# Patient Record
Sex: FEMALE | Race: WHITE | NOT HISPANIC OR LATINO | Employment: UNEMPLOYED | URBAN - METROPOLITAN AREA
[De-identification: names, ages, dates, MRNs, and addresses within clinical notes are randomized per-mention and may not be internally consistent; named-entity substitution may affect disease eponyms.]

---

## 2017-01-04 ENCOUNTER — OFFICE VISIT (OUTPATIENT)
Dept: INTERNAL MEDICINE | Facility: CLINIC | Age: 52
End: 2017-01-04

## 2017-01-04 VITALS
RESPIRATION RATE: 16 BRPM | HEART RATE: 78 BPM | BODY MASS INDEX: 32.01 KG/M2 | DIASTOLIC BLOOD PRESSURE: 86 MMHG | SYSTOLIC BLOOD PRESSURE: 122 MMHG | WEIGHT: 175 LBS

## 2017-01-04 DIAGNOSIS — R82.90 MALODOROUS URINE: ICD-10-CM

## 2017-01-04 DIAGNOSIS — M54.50 CHRONIC LOW BACK PAIN WITHOUT SCIATICA, UNSPECIFIED BACK PAIN LATERALITY: ICD-10-CM

## 2017-01-04 DIAGNOSIS — F41.9 ANXIETY: ICD-10-CM

## 2017-01-04 DIAGNOSIS — J44.9 CHRONIC OBSTRUCTIVE PULMONARY DISEASE, UNSPECIFIED COPD TYPE (HCC): ICD-10-CM

## 2017-01-04 DIAGNOSIS — F32.A DEPRESSION, UNSPECIFIED DEPRESSION TYPE: Primary | ICD-10-CM

## 2017-01-04 DIAGNOSIS — M79.2 NERVE PAIN: ICD-10-CM

## 2017-01-04 DIAGNOSIS — R23.2 HOT FLASHES: ICD-10-CM

## 2017-01-04 DIAGNOSIS — G89.29 CHRONIC LOW BACK PAIN WITHOUT SCIATICA, UNSPECIFIED BACK PAIN LATERALITY: ICD-10-CM

## 2017-01-04 LAB
CLARITY, POC: ABNORMAL
COLOR UR: YELLOW
EXPIRATION DATE: ABNORMAL
GLUCOSE UR STRIP-MCNC: NEGATIVE MG/DL
KETONES UR QL: NEGATIVE
LEUKOCYTE EST, POC: ABNORMAL
Lab: ABNORMAL
NITRITE UR-MCNC: POSITIVE MG/ML
PH UR: 7 [PH] (ref 5–8)
PROT UR STRIP-MCNC: ABNORMAL MG/DL
RBC # UR STRIP: ABNORMAL /UL
SP GR UR: 1.01 (ref 1–1.03)

## 2017-01-04 PROCEDURE — 99214 OFFICE O/P EST MOD 30 MIN: CPT | Performed by: INTERNAL MEDICINE

## 2017-01-04 PROCEDURE — 81002 URINALYSIS NONAUTO W/O SCOPE: CPT | Performed by: INTERNAL MEDICINE

## 2017-01-04 RX ORDER — DULOXETIN HYDROCHLORIDE 60 MG/1
60 CAPSULE, DELAYED RELEASE ORAL DAILY
Qty: 30 CAPSULE | Refills: 5 | Status: SHIPPED | OUTPATIENT
Start: 2017-01-04 | End: 2017-04-07 | Stop reason: DRUGHIGH

## 2017-01-04 RX ORDER — IPRATROPIUM/ALBUTEROL SULFATE 20-100 MCG
1 MIST INHALER (GRAM) INHALATION
Qty: 4 G | Refills: 5 | Status: SHIPPED | OUTPATIENT
Start: 2017-01-04 | End: 2017-07-07 | Stop reason: SDUPTHER

## 2017-01-04 RX ORDER — DULOXETIN HYDROCHLORIDE 30 MG/1
30 CAPSULE, DELAYED RELEASE ORAL DAILY
Qty: 30 CAPSULE | Refills: 5 | Status: SHIPPED | OUTPATIENT
Start: 2017-01-04 | End: 2017-04-07 | Stop reason: DRUGHIGH

## 2017-01-04 RX ORDER — CLONAZEPAM 1 MG/1
TABLET ORAL
Qty: 60 TABLET | Refills: 0 | Status: SHIPPED | OUTPATIENT
Start: 2017-01-04 | End: 2017-02-09 | Stop reason: SDUPTHER

## 2017-01-04 RX ORDER — ALBUTEROL SULFATE 2.5 MG/3ML
2.5 SOLUTION RESPIRATORY (INHALATION) EVERY 6 HOURS PRN
Qty: 75 ML | Refills: 2 | Status: SHIPPED | OUTPATIENT
Start: 2017-01-04

## 2017-01-04 RX ORDER — ESTRADIOL 1 MG/1
1 TABLET ORAL DAILY
Qty: 30 TABLET | Refills: 5 | Status: SHIPPED | OUTPATIENT
Start: 2017-01-04 | End: 2017-04-07 | Stop reason: SDUPTHER

## 2017-01-04 RX ORDER — GABAPENTIN 800 MG/1
800 TABLET ORAL 3 TIMES DAILY
Qty: 90 TABLET | Refills: 2 | Status: SHIPPED | OUTPATIENT
Start: 2017-01-04 | End: 2017-01-29 | Stop reason: SDUPTHER

## 2017-01-04 NOTE — PROGRESS NOTES
Subjective   Darcy Palomino is a 51 y.o. female.Pt is here to follow up on COPD, neuropathy and  anxiety/depression.              History of Present Illness   Pt is here to follow up on COPD, neuropathy and  anxiety/depression. She states all issues are stable. She does request a refill on her Klonopin, she states she is going in several weeks to her sister's Psych, Dr. Gomez, in [place].   She also request a Rx for a TENS unit due to her chronic low back pain. She states she has tried her brother's which does help her back.   She has the acute complaint of malodorous urine for the last 2 weeks. She denies fever, dysuria or chills.             The following portions of the patient's history were reviewed and updated as appropriate: allergies, current medications, past family history, past medical history, past social history, past surgical history and problem list.             Review of Systems   Constitutional: Negative.    HENT: Negative.    Eyes: Negative.    Respiratory:        See HPI.    Cardiovascular: Negative.    Gastrointestinal: Negative.    Endocrine: Negative.    Genitourinary:        See HPI.    Musculoskeletal: Negative.    Skin: Negative.    Allergic/Immunologic: Negative.    Neurological:        See HPI.    Hematological: Negative.    Psychiatric/Behavioral:        See HPI.                 Objective   Physical Exam   Constitutional: She is oriented to person, place, and time. She appears well-developed and well-nourished.   HENT:   Head: Normocephalic and atraumatic.   Right Ear: External ear normal.   Left Ear: External ear normal.   Nose: Nose normal.   Mouth/Throat: Oropharynx is clear and moist.   Eyes: Conjunctivae and EOM are normal. Pupils are equal, round, and reactive to light.   Neck: Normal range of motion. Neck supple. No tracheal deviation present. No thyromegaly present.   Cardiovascular: Normal rate, regular rhythm, normal heart sounds and intact distal pulses.    Pulmonary/Chest:  Effort normal and breath sounds normal.   Abdominal: Soft. Bowel sounds are normal. She exhibits no distension. There is no tenderness.   Neurological: She is alert and oriented to person, place, and time. She has normal reflexes.   Skin: Skin is warm and dry.   Psychiatric: She has a normal mood and affect.   Nursing note and vitals reviewed.                 Assessment/Plan   Darcy was seen today for copd.    Diagnoses and all orders for this visit:    Depression, unspecified depression type    Nerve pain  -     gabapentin (NEURONTIN) 800 MG tablet; Take 1 tablet by mouth 3 (Three) Times a Day.    Chronic obstructive pulmonary disease, unspecified COPD type  -     COMBIVENT RESPIMAT  MCG/ACT inhaler; Inhale 1 puff 4 (Four) Times a Day.  -     albuterol (PROVENTIL) (2.5 MG/3ML) 0.083% nebulizer solution; Take 2.5 mg by nebulization Every 6 (Six) Hours As Needed for wheezing or shortness of air.    Anxiety    Other orders  -     estradiol (ESTRACE) 1 MG tablet; Take 1 tablet by mouth Daily.  -     DULoxetine (CYMBALTA) 60 MG capsule; Take 1 capsule by mouth Daily.  -     DULoxetine (CYMBALTA) 30 MG capsule; Take 1 capsule by mouth Daily.                1.) Refill chronic meds   2.) TENS unit for chronic neck and back pain ordered.  3.) Klonopin 1mg PO BID ,  IF she does not get in psych in next few weeks.   Take 1 mg PO BID X 3 days, then take 1 mg PO daily X 14 days, then 0.5 mg PO daily X 14 days. How to dose and possible s/e    discussed.   4.) UDS today- due to klonopin use.   5.) Urinalysis for malodorous urine.     * Total time spent in discussion and exam 30 minutes.

## 2017-01-04 NOTE — TELEPHONE ENCOUNTER
----- Message from Rubia Low DO sent at 1/4/2017  1:11 PM EST -----  Let patient know her urinalysis was positive. I have sent for culture. Send in Cipro 500 mg PO BID X 7 days with no refills. Tell her to eat before taking this medication as it may make her nauseated.

## 2017-01-04 NOTE — MR AVS SNAPSHOT
Darcy Palomino   1/4/2017 11:30 AM   Office Visit    Provider:  Rubia Low DO   Department:  [facility] INTERNAL MEDICINE AND PEDIATRICS   Dept Phone:  [phone]                Your Full Care Plan              Today's Medication Changes          These changes are accurate as of: 1/4/17 11:58 AM.  If you have any questions, ask your nurse or doctor.               Medication(s)that have changed:     clonazePAM 1 MG tablet   Commonly known as:  KLONOPIN   Take 1mg PO BID prn   What changed:  additional instructions   Changed by:  Rubia Low DO            Where to Get Your Medications      These medications were sent to [address] AT [address] [phone] [place] [phone] [place] [address]     Phone:  [phone]     albuterol (2.5 MG/3ML) 0.083% nebulizer solution    COMBIVENT RESPIMAT  MCG/ACT inhaler    DULoxetine 30 MG capsule    DULoxetine 60 MG capsule    estradiol 1 MG tablet    gabapentin 800 MG tablet         You can get these medications from any pharmacy     Bring a paper prescription for each of these medications     clonazePAM 1 MG tablet                  Your Updated Medication List          This list is accurate as of: 1/4/17 11:58 AM.  Always use your most recent med list.                albuterol (2.5 MG/3ML) 0.083% nebulizer solution   Commonly known as:  PROVENTIL   Take 2.5 mg by nebulization Every 6 (Six) Hours As Needed for wheezing or shortness of air.       clonazePAM 1 MG tablet   Commonly known as:  KLONOPIN   Take 1mg PO BID prn       COMBIVENT RESPIMAT  MCG/ACT inhaler   Generic drug:  ipratropium-albuterol   Inhale 1 puff 4 (Four) Times a Day.       * DULoxetine 60 MG capsule   Commonly known as:  CYMBALTA   Take 1 capsule by mouth Daily.       * DULoxetine 30 MG capsule   Commonly known as:  CYMBALTA   Take 1 capsule by mouth  Daily.       estradiol 1 MG tablet   Commonly known as:  ESTRACE   Take 1 tablet by mouth Daily.       gabapentin 800 MG tablet   Commonly known as:  NEURONTIN   Take 1 tablet by mouth 3 (Three) Times a Day.       ondansetron 4 MG tablet   Commonly known as:  ZOFRAN   Take 1 tablet by mouth Every 8 (Eight) Hours As Needed for nausea or vomiting.       oxyCODONE-acetaminophen  MG per tablet   Commonly known as:  PERCOCET   Take 1 tablet by mouth Every 6 (Six) Hours As Needed for moderate pain (4-6).       tiZANidine 4 MG tablet   Commonly known as:  ZANAFLEX   TAKE 1 TABLET BY MOUTH EVERY 8 HOURS       * Notice:  This list has 2 medication(s) that are the same as other medications prescribed for you. Read the directions carefully, and ask your doctor or other care provider to review them with you.            We Performed the Following     Pain Management Profile (13 Drugs) Urine     TENS (Transcutaneous Electrical Nerve Stimulator)       You Were Diagnosed With        Codes Comments    Depression, unspecified depression type    -  Primary ICD-10-CM: F32.9  ICD-9-CM: 311     Nerve pain     ICD-10-CM: M79.2  ICD-9-CM: 729.2     Chronic obstructive pulmonary disease, unspecified COPD type     ICD-10-CM: J44.9  ICD-9-CM: 496     Anxiety     ICD-10-CM: F41.9  ICD-9-CM: 300.00     Hot flashes     ICD-10-CM: R23.2  ICD-9-CM: 782.62     Chronic low back pain without sciatica, unspecified back pain laterality     ICD-10-CM: M54.5, G89.29  ICD-9-CM: 724.2, 338.29       Instructions     None    Patient Instructions History      MyChart Signup     Our records indicate that you have declined [facility] [employer] signup. If you would like to sign up for [address] [employer], please email [email or web address] or call [phone] to obtain an activation code.             Other Info from Your Visit           Your Appointments     Apr 06, 2017  8:15 AM EDT   Follow Up with Rubia Low,    [facility]  INTERNAL MEDICINE AND PEDIATRICS (--)    [address] [facility] [address]  [facility] [address] [phone]           Arrive 15 minutes prior to appointment.              Allergies     Lidocaine      Nsaids      Toradol [Ketorolac Tromethamine]        Reason for Visit     COPD follow up      Vital Signs     Blood Pressure Pulse Respirations Weight Body Mass Index Smoking Status    122/86 (BP Location: Left arm, Patient Position: Sitting) 78 16 175 lb (79.4 kg) 32.01 kg/m2 Current Every Day Smoker      Problems and Diagnoses Noted     Anxiety problem    Chronic airway obstruction    Depression    Hot flashes    Nerve pain    Chronic low back pain without sciatica, unspecified back pain laterality          No Longer an Issue     Urinary tract infection

## 2017-01-05 RX ORDER — CIPROFLOXACIN 500 MG/1
500 TABLET, FILM COATED ORAL 2 TIMES DAILY
Qty: 14 TABLET | Refills: 0 | Status: SHIPPED | OUTPATIENT
Start: 2017-01-05 | End: 2017-02-09

## 2017-01-07 LAB
BACTERIA UR CULT: ABNORMAL
OTHER ANTIBIOTIC SUSC ISLT: ABNORMAL

## 2017-01-09 ENCOUNTER — TELEPHONE (OUTPATIENT)
Dept: INTERNAL MEDICINE | Facility: CLINIC | Age: 52
End: 2017-01-09

## 2017-01-09 RX ORDER — AMOXICILLIN AND CLAVULANATE POTASSIUM 875; 125 MG/1; MG/1
1 TABLET, FILM COATED ORAL 2 TIMES DAILY
Qty: 20 TABLET | Refills: 0 | Status: SHIPPED | OUTPATIENT
Start: 2017-01-09 | End: 2017-02-09

## 2017-01-09 NOTE — TELEPHONE ENCOUNTER
----- Message from Rubia Low DO sent at 1/9/2017 12:54 PM EST -----  Send in Augmentin 875 mg PO BID X 10 days with no refills.   She does not have to repeat another urinalysis after unless she has symptoms.

## 2017-01-10 LAB
AMPHETAMINES UR QL SCN: NEGATIVE NG/ML
BARBITURATES UR QL SCN: NEGATIVE NG/ML
BENZODIAZ UR QL SCN: NEGATIVE NG/ML
BZE UR QL SCN: NEGATIVE NG/ML
CANNABINOIDS UR QL SCN: NEGATIVE NG/ML
CREAT UR-MCNC: 48.1 MG/DL (ref 20–300)
FENTANYL+NORFENTANYL UR QL SCN: NEGATIVE PG/ML
Lab: ABNORMAL
MEPERIDINE UR QL: NEGATIVE NG/ML
METHADONE UR QL SCN: NEGATIVE NG/ML
OPIATES UR QL SCN: POSITIVE NG/ML
OXYCODONE+OXYMORPHONE UR QL SCN: POSITIVE
PCP UR QL: NEGATIVE NG/ML
PH UR: 8.4 [PH] (ref 4.5–8.9)
PROPOXYPH UR QL SCN: NEGATIVE NG/ML
SP GR UR: 1.01
TRAMADOL UR QL SCN: NEGATIVE NG/ML

## 2017-01-29 DIAGNOSIS — M79.2 NERVE PAIN: ICD-10-CM

## 2017-01-30 RX ORDER — GABAPENTIN 800 MG/1
TABLET ORAL
Qty: 90 TABLET | Refills: 3 | Status: SHIPPED | OUTPATIENT
Start: 2017-01-30

## 2017-02-09 ENCOUNTER — OFFICE VISIT (OUTPATIENT)
Dept: INTERNAL MEDICINE | Facility: CLINIC | Age: 52
End: 2017-02-09

## 2017-02-09 ENCOUNTER — HOSPITAL ENCOUNTER (OUTPATIENT)
Dept: GENERAL RADIOLOGY | Facility: HOSPITAL | Age: 52
Discharge: HOME | End: 2017-02-09
Admitting: PHYSICIAN ASSISTANT

## 2017-02-09 VITALS
OXYGEN SATURATION: 97 % | DIASTOLIC BLOOD PRESSURE: 88 MMHG | HEART RATE: 87 BPM | SYSTOLIC BLOOD PRESSURE: 126 MMHG | WEIGHT: 164 LBS | TEMPERATURE: 97.9 F | RESPIRATION RATE: 18 BRPM | BODY MASS INDEX: 30 KG/M2

## 2017-02-09 DIAGNOSIS — N30.01 ACUTE CYSTITIS WITH HEMATURIA: ICD-10-CM

## 2017-02-09 DIAGNOSIS — R82.90 BAD ODOR OF URINE: Primary | ICD-10-CM

## 2017-02-09 DIAGNOSIS — G89.29 CHRONIC PAIN OF RIGHT KNEE: ICD-10-CM

## 2017-02-09 DIAGNOSIS — M25.561 CHRONIC PAIN OF RIGHT KNEE: ICD-10-CM

## 2017-02-09 DIAGNOSIS — F41.9 ANXIETY: ICD-10-CM

## 2017-02-09 DIAGNOSIS — H60.339 SWIMMER'S EAR, UNSPECIFIED CHRONICITY, UNSPECIFIED LATERALITY: ICD-10-CM

## 2017-02-09 PROBLEM — Z93.6 PRESENCE OF UROSTOMY (HCC): Status: ACTIVE | Noted: 2017-02-09

## 2017-02-09 LAB
BILIRUB BLD-MCNC: NEGATIVE MG/DL
CLARITY, POC: CLEAR
COLOR UR: YELLOW
EXPIRATION DATE: ABNORMAL
GLUCOSE UR STRIP-MCNC: NEGATIVE MG/DL
KETONES UR QL: NEGATIVE
LEUKOCYTE EST, POC: NEGATIVE
Lab: ABNORMAL
NITRITE UR-MCNC: NEGATIVE MG/ML
PH UR: 8 [PH] (ref 5–8)
PROT UR STRIP-MCNC: NEGATIVE MG/DL
RBC # UR STRIP: ABNORMAL /UL
SP GR UR: 1.01 (ref 1–1.03)
UROBILINOGEN UR QL: NORMAL

## 2017-02-09 PROCEDURE — 87086 URINE CULTURE/COLONY COUNT: CPT | Performed by: PHYSICIAN ASSISTANT

## 2017-02-09 PROCEDURE — 81003 URINALYSIS AUTO W/O SCOPE: CPT | Performed by: PHYSICIAN ASSISTANT

## 2017-02-09 PROCEDURE — 73562 X-RAY EXAM OF KNEE 3: CPT

## 2017-02-09 PROCEDURE — 99214 OFFICE O/P EST MOD 30 MIN: CPT | Performed by: PHYSICIAN ASSISTANT

## 2017-02-09 RX ORDER — METHYLPREDNISOLONE 4 MG/1
TABLET ORAL
Qty: 21 TABLET | Refills: 0 | Status: SHIPPED | OUTPATIENT
Start: 2017-02-09 | End: 2017-02-18 | Stop reason: HOSPADM

## 2017-02-09 RX ORDER — CIPROFLOXACIN 500 MG/1
500 TABLET, FILM COATED ORAL 2 TIMES DAILY
Qty: 14 TABLET | Refills: 0 | Status: SHIPPED | OUTPATIENT
Start: 2017-02-09 | End: 2017-02-18 | Stop reason: HOSPADM

## 2017-02-09 RX ORDER — PROMETHAZINE HYDROCHLORIDE 25 MG/1
TABLET ORAL
COMMUNITY
Start: 2017-01-15 | End: 2017-05-16 | Stop reason: SDUPTHER

## 2017-02-09 RX ORDER — CLONAZEPAM 1 MG/1
TABLET ORAL
Qty: 60 TABLET | Refills: 0 | Status: SHIPPED | OUTPATIENT
Start: 2017-02-09 | End: 2017-04-05 | Stop reason: ALTCHOICE

## 2017-02-09 NOTE — PROGRESS NOTES
Subjective   Darcy Palomino is a 51 y.o. female.   Chief Complaint   Patient presents with   • Urinary Tract Infection   • Earache   • Joint Swelling     knee     History of Present Illness   PT complains of bilateral ear pain x 4-5 days, right worse than left, right knee pain.  PT complains of vaginal itching, urine has odor x 2 weeks.  She has urostomy bag x 1 year.    Hx of fracture of right knee.    PT is going to see psychiatrist next month and requests refills on clonazepam.  Getting hives from stress.    The following portions of the patient's history were reviewed and updated as appropriate: allergies, current medications and problem list.    Review of Systems   HENT: Positive for ear pain.    Psychiatric/Behavioral: Negative for sleep disturbance.       Objective   Physical Exam   Constitutional: She appears well-developed and well-nourished.   HENT:   Head: Normocephalic and atraumatic.   Right Ear: Tympanic membrane and external ear normal.   Left Ear: Tympanic membrane and external ear normal.   Mouth/Throat: No posterior oropharyngeal erythema. No tonsillar exudate.   Bilateral ear canals are edematous with erythema.   Eyes: Conjunctivae are normal.   Cardiovascular: Normal rate, regular rhythm and normal heart sounds.  Exam reveals no gallop and no friction rub.    No murmur heard.  Pulmonary/Chest: Effort normal and breath sounds normal.   Abdominal: Normal appearance and bowel sounds are normal. There is no tenderness. There is no CVA tenderness.   Musculoskeletal:        Right knee: She exhibits decreased range of motion, effusion and abnormal patellar mobility. She exhibits no swelling, no ecchymosis, no erythema, no LCL laxity and no MCL laxity. Tenderness found. Lateral joint line tenderness noted. No patellar tendon tenderness noted.   Psychiatric: She has a normal mood and affect.   Vitals reviewed.      Assessment/Plan   Darcy was seen today for urinary tract infection, earache and joint  swelling.    Diagnoses and all orders for this visit:    Bad odor of urine  -     POC Urinalysis Dipstick, Automated  -     Urine Culture    Anxiety  -     clonazePAM (KLONOPIN) 1 MG tablet; Take 1mg PO BID prn    Chronic pain of right knee  -     MethylPREDNISolone (MEDROL, PARVIN,) 4 MG tablet; Take as directed on package instructions.  -     XR Knee 3 View Right    Acute cystitis with hematuria  -     ciprofloxacin (CIPRO) 500 MG tablet; Take 1 tablet by mouth 2 (Two) Times a Day.    Swimmer's ear, unspecified chronicity, unspecified laterality  -     ciprofloxacin (CIPRO) 500 MG tablet; Take 1 tablet by mouth 2 (Two) Times a Day.

## 2017-02-10 ENCOUNTER — TELEPHONE (OUTPATIENT)
Dept: INTERNAL MEDICINE | Facility: CLINIC | Age: 52
End: 2017-02-10

## 2017-02-10 DIAGNOSIS — M25.561 RIGHT KNEE PAIN, UNSPECIFIED CHRONICITY: Primary | ICD-10-CM

## 2017-02-10 NOTE — TELEPHONE ENCOUNTER
----- Message from Aaron Montoya sent at 2/10/2017  3:15 PM EST -----  [phone]    PT RETURNING PHONE CALL

## 2017-02-10 NOTE — TELEPHONE ENCOUNTER
----- Message from Griselda Mohr MA sent at 2/10/2017 11:53 AM EST -----  PT HAD XRAYS DONE YESTERDAY AND CALLING TO GET THE RESULTS. YOU CAN CONTACT PT [phone]

## 2017-02-10 NOTE — TELEPHONE ENCOUNTER
pls let her know that xray shows a bone spur in her knee cap. Will refer to ortho to see if joint injection might help.  Recommend she get a copy the xr and bring to that appt.

## 2017-02-12 LAB
BACTERIA UR CULT: NORMAL
Lab: NORMAL

## 2017-02-17 ENCOUNTER — HOSPITAL ENCOUNTER (OUTPATIENT)
Facility: HOSPITAL | Age: 52
Setting detail: OBSERVATION
LOS: 1 days | Discharge: HOME | End: 2017-02-18
Attending: EMERGENCY MEDICINE | Admitting: INTERNAL MEDICINE

## 2017-02-17 ENCOUNTER — APPOINTMENT (OUTPATIENT)
Dept: GENERAL RADIOLOGY | Facility: HOSPITAL | Age: 52
End: 2017-02-17

## 2017-02-17 ENCOUNTER — APPOINTMENT (OUTPATIENT)
Dept: CARDIOLOGY | Facility: HOSPITAL | Age: 52
End: 2017-02-17
Attending: INTERNAL MEDICINE

## 2017-02-17 DIAGNOSIS — R07.9 CHEST PAIN, UNSPECIFIED TYPE: ICD-10-CM

## 2017-02-17 DIAGNOSIS — R74.8 ELEVATED LIPASE: Primary | ICD-10-CM

## 2017-02-17 PROBLEM — Z72.0 TOBACCO ABUSE: Status: ACTIVE | Noted: 2017-02-17

## 2017-02-17 PROBLEM — N18.30 CHRONIC KIDNEY DISEASE, STAGE 3 (HCC): Status: ACTIVE | Noted: 2017-02-17

## 2017-02-17 LAB
ALBUMIN SERPL-MCNC: 4.6 G/DL (ref 3.2–4.8)
ALBUMIN/GLOB SERPL: 1.4 G/DL (ref 1.5–2.5)
ALP SERPL-CCNC: 78 U/L (ref 25–100)
ALT SERPL W P-5'-P-CCNC: 12 U/L (ref 7–40)
ANION GAP SERPL CALCULATED.3IONS-SCNC: 5 MMOL/L (ref 3–11)
AST SERPL-CCNC: 16 U/L (ref 0–33)
BASOPHILS # BLD AUTO: 0.06 10*3/MM3 (ref 0–0.2)
BASOPHILS NFR BLD AUTO: 0.6 % (ref 0–1)
BILIRUB SERPL-MCNC: 0.3 MG/DL (ref 0.3–1.2)
BNP SERPL-MCNC: 14 PG/ML (ref 0–100)
BUN BLD-MCNC: 30 MG/DL (ref 9–23)
BUN/CREAT SERPL: 20 (ref 7–25)
CALCIUM SPEC-SCNC: 10.2 MG/DL (ref 8.7–10.4)
CHLORIDE SERPL-SCNC: 103 MMOL/L (ref 99–109)
CO2 SERPL-SCNC: 29 MMOL/L (ref 20–31)
CREAT BLD-MCNC: 1.5 MG/DL (ref 0.6–1.3)
DEPRECATED RDW RBC AUTO: 49.5 FL (ref 37–54)
EOSINOPHIL # BLD AUTO: 0.34 10*3/MM3 (ref 0.1–0.3)
EOSINOPHIL NFR BLD AUTO: 3.5 % (ref 0–3)
ERYTHROCYTE [DISTWIDTH] IN BLOOD BY AUTOMATED COUNT: 13.4 % (ref 11.3–14.5)
GFR SERPL CREATININE-BSD FRML MDRD: 37 ML/MIN/1.73
GLOBULIN UR ELPH-MCNC: 3.4 GM/DL
GLUCOSE BLD-MCNC: 82 MG/DL (ref 70–100)
HCT VFR BLD AUTO: 52.1 % (ref 34.5–44)
HGB BLD-MCNC: 17.9 G/DL (ref 11.5–15.5)
HOLD SPECIMEN: NORMAL
HOLD SPECIMEN: NORMAL
IMM GRANULOCYTES # BLD: 0.03 10*3/MM3 (ref 0–0.03)
IMM GRANULOCYTES NFR BLD: 0.3 % (ref 0–0.6)
LIPASE SERPL-CCNC: 184 U/L (ref 6–51)
LV EF NUC BP: 64 %
LYMPHOCYTES # BLD AUTO: 3.58 10*3/MM3 (ref 0.6–4.8)
LYMPHOCYTES NFR BLD AUTO: 37.3 % (ref 24–44)
MCH RBC QN AUTO: 34.6 PG (ref 27–31)
MCHC RBC AUTO-ENTMCNC: 34.4 G/DL (ref 32–36)
MCV RBC AUTO: 100.8 FL (ref 80–99)
MONOCYTES # BLD AUTO: 0.76 10*3/MM3 (ref 0–1)
MONOCYTES NFR BLD AUTO: 7.9 % (ref 0–12)
NEUTROPHILS # BLD AUTO: 4.82 10*3/MM3 (ref 1.5–8.3)
NEUTROPHILS NFR BLD AUTO: 50.4 % (ref 41–71)
PLATELET # BLD AUTO: 250 10*3/MM3 (ref 150–450)
PMV BLD AUTO: 9.2 FL (ref 6–12)
POTASSIUM BLD-SCNC: 3.5 MMOL/L (ref 3.5–5.5)
PROT SERPL-MCNC: 8 G/DL (ref 5.7–8.2)
RBC # BLD AUTO: 5.17 10*6/MM3 (ref 3.89–5.14)
SODIUM BLD-SCNC: 137 MMOL/L (ref 132–146)
TROPONIN I SERPL-MCNC: 0 NG/ML (ref 0–0.07)
TROPONIN I SERPL-MCNC: 0.02 NG/ML (ref 0–0.07)
WBC NRBC COR # BLD: 9.59 10*3/MM3 (ref 3.5–10.8)
WHOLE BLOOD HOLD SPECIMEN: NORMAL
WHOLE BLOOD HOLD SPECIMEN: NORMAL

## 2017-02-17 PROCEDURE — 25010000002 MORPHINE PER 10 MG: Performed by: EMERGENCY MEDICINE

## 2017-02-17 PROCEDURE — 96372 THER/PROPH/DIAG INJ SC/IM: CPT

## 2017-02-17 PROCEDURE — 84484 ASSAY OF TROPONIN QUANT: CPT

## 2017-02-17 PROCEDURE — 71010 HC CHEST PA OR AP: CPT

## 2017-02-17 PROCEDURE — 83880 ASSAY OF NATRIURETIC PEPTIDE: CPT | Performed by: EMERGENCY MEDICINE

## 2017-02-17 PROCEDURE — 83690 ASSAY OF LIPASE: CPT | Performed by: EMERGENCY MEDICINE

## 2017-02-17 PROCEDURE — 25010000002 REGADENOSON 0.4 MG/5ML SOLUTION: Performed by: INTERNAL MEDICINE

## 2017-02-17 PROCEDURE — 96376 TX/PRO/DX INJ SAME DRUG ADON: CPT

## 2017-02-17 PROCEDURE — 78492 MYOCRD IMG PET MLT RST&STRS: CPT

## 2017-02-17 PROCEDURE — 96374 THER/PROPH/DIAG INJ IV PUSH: CPT

## 2017-02-17 PROCEDURE — G0378 HOSPITAL OBSERVATION PER HR: HCPCS

## 2017-02-17 PROCEDURE — 0 RUBIDIUM CHLORIDE: Performed by: INTERNAL MEDICINE

## 2017-02-17 PROCEDURE — 99285 EMERGENCY DEPT VISIT HI MDM: CPT

## 2017-02-17 PROCEDURE — 96361 HYDRATE IV INFUSION ADD-ON: CPT

## 2017-02-17 PROCEDURE — 25010000002 HEPARIN (PORCINE) PER 1000 UNITS: Performed by: INTERNAL MEDICINE

## 2017-02-17 PROCEDURE — 80053 COMPREHEN METABOLIC PANEL: CPT | Performed by: EMERGENCY MEDICINE

## 2017-02-17 PROCEDURE — 99220 PR INITIAL OBSERVATION CARE/DAY 70 MINUTES: CPT | Performed by: INTERNAL MEDICINE

## 2017-02-17 PROCEDURE — 85025 COMPLETE CBC W/AUTO DIFF WBC: CPT | Performed by: EMERGENCY MEDICINE

## 2017-02-17 PROCEDURE — 93017 CV STRESS TEST TRACING ONLY: CPT

## 2017-02-17 PROCEDURE — 93018 CV STRESS TEST I&R ONLY: CPT | Performed by: INTERNAL MEDICINE

## 2017-02-17 PROCEDURE — 78492 MYOCRD IMG PET MLT RST&STRS: CPT | Performed by: INTERNAL MEDICINE

## 2017-02-17 PROCEDURE — 93005 ELECTROCARDIOGRAM TRACING: CPT | Performed by: EMERGENCY MEDICINE

## 2017-02-17 PROCEDURE — A9555 RB82 RUBIDIUM: HCPCS | Performed by: INTERNAL MEDICINE

## 2017-02-17 RX ORDER — ASPIRIN 81 MG/1
324 TABLET, CHEWABLE ORAL ONCE
Status: COMPLETED | OUTPATIENT
Start: 2017-02-17 | End: 2017-02-17

## 2017-02-17 RX ORDER — NICOTINE 21 MG/24HR
1 PATCH, TRANSDERMAL 24 HOURS TRANSDERMAL EVERY 24 HOURS
Status: DISCONTINUED | OUTPATIENT
Start: 2017-02-17 | End: 2017-02-18 | Stop reason: HOSPADM

## 2017-02-17 RX ORDER — HEPARIN SODIUM 5000 [USP'U]/ML
5000 INJECTION, SOLUTION INTRAVENOUS; SUBCUTANEOUS EVERY 8 HOURS SCHEDULED
Status: DISCONTINUED | OUTPATIENT
Start: 2017-02-17 | End: 2017-02-18 | Stop reason: HOSPADM

## 2017-02-17 RX ORDER — NICOTINE 21 MG/24HR
1 PATCH, TRANSDERMAL 24 HOURS TRANSDERMAL EVERY 24 HOURS
Status: DISCONTINUED | OUTPATIENT
Start: 2017-02-17 | End: 2017-02-17

## 2017-02-17 RX ORDER — CLONAZEPAM 1 MG/1
1 TABLET ORAL 2 TIMES DAILY PRN
Status: DISCONTINUED | OUTPATIENT
Start: 2017-02-17 | End: 2017-02-18 | Stop reason: HOSPADM

## 2017-02-17 RX ORDER — OXYCODONE AND ACETAMINOPHEN 10; 325 MG/1; MG/1
1 TABLET ORAL EVERY 6 HOURS PRN
Status: DISCONTINUED | OUTPATIENT
Start: 2017-02-17 | End: 2017-02-18 | Stop reason: HOSPADM

## 2017-02-17 RX ORDER — ASPIRIN 325 MG
325 TABLET, DELAYED RELEASE (ENTERIC COATED) ORAL DAILY
Status: DISCONTINUED | OUTPATIENT
Start: 2017-02-17 | End: 2017-02-18 | Stop reason: HOSPADM

## 2017-02-17 RX ORDER — ESTRADIOL 0.5 MG/1
1 TABLET ORAL DAILY
Status: DISCONTINUED | OUTPATIENT
Start: 2017-02-17 | End: 2017-02-18 | Stop reason: HOSPADM

## 2017-02-17 RX ORDER — DULOXETIN HYDROCHLORIDE 60 MG/1
60 CAPSULE, DELAYED RELEASE ORAL DAILY
Status: DISCONTINUED | OUTPATIENT
Start: 2017-02-17 | End: 2017-02-18 | Stop reason: HOSPADM

## 2017-02-17 RX ORDER — NICOTINE 21 MG/24HR
1 PATCH, TRANSDERMAL 24 HOURS TRANSDERMAL
Status: DISCONTINUED | OUTPATIENT
Start: 2017-02-17 | End: 2017-02-17

## 2017-02-17 RX ORDER — DULOXETIN HYDROCHLORIDE 30 MG/1
30 CAPSULE, DELAYED RELEASE ORAL DAILY
Status: DISCONTINUED | OUTPATIENT
Start: 2017-02-17 | End: 2017-02-18 | Stop reason: HOSPADM

## 2017-02-17 RX ORDER — GABAPENTIN 400 MG/1
800 CAPSULE ORAL EVERY 8 HOURS SCHEDULED
Status: DISCONTINUED | OUTPATIENT
Start: 2017-02-17 | End: 2017-02-18 | Stop reason: HOSPADM

## 2017-02-17 RX ORDER — ATORVASTATIN CALCIUM 40 MG/1
40 TABLET, FILM COATED ORAL NIGHTLY
Status: DISCONTINUED | OUTPATIENT
Start: 2017-02-17 | End: 2017-02-18 | Stop reason: HOSPADM

## 2017-02-17 RX ORDER — SODIUM CHLORIDE 0.9 % (FLUSH) 0.9 %
1-10 SYRINGE (ML) INJECTION AS NEEDED
Status: DISCONTINUED | OUTPATIENT
Start: 2017-02-17 | End: 2017-02-18 | Stop reason: HOSPADM

## 2017-02-17 RX ORDER — SODIUM CHLORIDE 0.9 % (FLUSH) 0.9 %
10 SYRINGE (ML) INJECTION AS NEEDED
Status: DISCONTINUED | OUTPATIENT
Start: 2017-02-17 | End: 2017-02-18 | Stop reason: HOSPADM

## 2017-02-17 RX ORDER — MORPHINE SULFATE 4 MG/ML
4 INJECTION, SOLUTION INTRAMUSCULAR; INTRAVENOUS ONCE
Status: COMPLETED | OUTPATIENT
Start: 2017-02-17 | End: 2017-02-17

## 2017-02-17 RX ORDER — NITROGLYCERIN 0.4 MG/1
0.4 TABLET SUBLINGUAL
Status: DISCONTINUED | OUTPATIENT
Start: 2017-02-17 | End: 2017-02-18 | Stop reason: HOSPADM

## 2017-02-17 RX ORDER — TIZANIDINE 4 MG/1
4 TABLET ORAL EVERY 8 HOURS PRN
Status: DISCONTINUED | OUTPATIENT
Start: 2017-02-17 | End: 2017-02-18 | Stop reason: HOSPADM

## 2017-02-17 RX ADMIN — HEPARIN SODIUM 5000 UNITS: 5000 INJECTION, SOLUTION INTRAVENOUS; SUBCUTANEOUS at 13:52

## 2017-02-17 RX ADMIN — NICOTINE 1 PATCH: 14 PATCH TRANSDERMAL at 07:31

## 2017-02-17 RX ADMIN — ESTRADIOL 1 MG: 0.5 TABLET ORAL at 11:17

## 2017-02-17 RX ADMIN — NITROGLYCERIN 0.4 MG: 0.4 TABLET SUBLINGUAL at 06:31

## 2017-02-17 RX ADMIN — TIZANIDINE 4 MG: 4 TABLET ORAL at 22:38

## 2017-02-17 RX ADMIN — SODIUM CHLORIDE 1000 ML: 9 INJECTION, SOLUTION INTRAVENOUS at 04:53

## 2017-02-17 RX ADMIN — REGADENOSON 0.4 MG: 0.08 INJECTION, SOLUTION INTRAVENOUS at 13:26

## 2017-02-17 RX ADMIN — ASPIRIN 81 MG 324 MG: 81 TABLET ORAL at 03:54

## 2017-02-17 RX ADMIN — OXYCODONE HYDROCHLORIDE AND ACETAMINOPHEN 1 TABLET: 10; 325 TABLET ORAL at 21:21

## 2017-02-17 RX ADMIN — ATORVASTATIN CALCIUM 40 MG: 40 TABLET, FILM COATED ORAL at 21:20

## 2017-02-17 RX ADMIN — CLONAZEPAM 1 MG: 1 TABLET ORAL at 12:00

## 2017-02-17 RX ADMIN — RUBIDIUM CHLORIDE RB-82 1 DOSE: 150 INJECTION, SOLUTION INTRAVENOUS at 13:13

## 2017-02-17 RX ADMIN — MORPHINE SULFATE 4 MG: 4 INJECTION, SOLUTION INTRAMUSCULAR; INTRAVENOUS at 04:52

## 2017-02-17 RX ADMIN — OXYCODONE HYDROCHLORIDE AND ACETAMINOPHEN 1 TABLET: 10; 325 TABLET ORAL at 09:27

## 2017-02-17 RX ADMIN — RUBIDIUM CHLORIDE RB-82 1 DOSE: 150 INJECTION, SOLUTION INTRAVENOUS at 13:26

## 2017-02-17 RX ADMIN — DULOXETINE 60 MG: 60 CAPSULE, DELAYED RELEASE ORAL at 11:19

## 2017-02-17 RX ADMIN — DULOXETINE 90 MG: 60 CAPSULE, DELAYED RELEASE ORAL at 11:18

## 2017-02-17 RX ADMIN — MORPHINE SULFATE 4 MG: 4 INJECTION, SOLUTION INTRAMUSCULAR; INTRAVENOUS at 07:17

## 2017-02-17 RX ADMIN — OXYCODONE HYDROCHLORIDE AND ACETAMINOPHEN 1 TABLET: 10; 325 TABLET ORAL at 15:32

## 2017-02-17 RX ADMIN — GABAPENTIN 800 MG: 400 CAPSULE ORAL at 21:20

## 2017-02-17 RX ADMIN — NICOTINE 1 PATCH: 21 PATCH, EXTENDED RELEASE TRANSDERMAL at 13:51

## 2017-02-17 RX ADMIN — HEPARIN SODIUM 5000 UNITS: 5000 INJECTION, SOLUTION INTRAVENOUS; SUBCUTANEOUS at 21:22

## 2017-02-17 RX ADMIN — GABAPENTIN 800 MG: 400 CAPSULE ORAL at 13:52

## 2017-02-17 NOTE — H&P
[facility]  HISTORY AND PHYSICAL    Primary Care Physician: Rubia Low DO    Subjective     Chief Complaint:  Chest pain    History of Present Illness:   Patient is a 51-year-old female with a history of chronic kidney disease, depression, chronic pain, and ongoing tobacco abuse who presents to emergency room with complaints of chest pain.  She woke up around 3 AM with a sharp pain in her left chest which she says radiated to her back.  It lasted 2-3 minutes before easing up and since that time has been intermittent.  She has never had pain like this in the past.  It is associated with her left arm being heavy and some low-grade nausea but no emesis.  She has no cardiac history that she is aware of.  Does not take aspirin.  In the emergency room 2 sets of cardiac enzymes have been negative and EKG shows some nonspecific T-wave changes.  She denies any abdominal pain.  She had a elevated lipase 184 but denies any history of pancreatitis.  She is being admitted for further evaluation.      Review of Systems   Otherwise complete ROS performed and negative except as mentioned in the HPI.    Past Medical History:   Past Medical History   Diagnosis Date   • Anxiety    • Asthma    • Cervical cancer    • Chronic bronchitis    • COPD (chronic obstructive pulmonary disease)    • Depression    • Kidney disease        Past Surgical History:  Past Surgical History   Procedure Laterality Date   • Cystectomy     • Hysterectomy         Family History: family history includes Bone cancer in her maternal grandmother; Colon cancer in her mother; Heart attack in her maternal uncle; Stroke in her brother and father.   Since she has heart disease in both sides of her family spell is unclear on the specifics.    Social History:  reports that she has been smoking.  She does not have any smokeless tobacco history on file. She reports that she does not drink alcohol or use illicit drugs.  "currently smokes about a half pack a day down from 2 packs in the past.  Denies any alcohol abuse denies drugs.  Currently on disability.    Medications:  Home medication list is reviewed    Allergies:  Allergies   Allergen Reactions   • Lidocaine    • Nsaids    • Toradol [Ketorolac Tromethamine]        Objective     Physical Exam:  Vital Signs:   Visit Vitals   • /72   • Pulse 82   • Temp 97.5 °F (36.4 °C)   • Resp 16   • Ht 66\" (167.6 cm)   • Wt 170 lb (77.1 kg)   • SpO2 96%   • BMI 27.44 kg/m2     Physical Exam   Constitutional: She is oriented to person, place, and time. She appears well-developed and well-nourished. No distress.   Appears comfortable sitting up in bed and eating a sandwich.   HENT:   Head: Normocephalic.   Eyes: Pupils are equal, round, and reactive to light.   Cardiovascular: Normal rate and regular rhythm.    Positive systolic murmur which she says is there previously   Pulmonary/Chest: Effort normal.   Very faint expiratory wheeze   Abdominal: Soft. Bowel sounds are normal. There is no tenderness.   Musculoskeletal: She exhibits no edema.   Neurological: She is alert and oriented to person, place, and time.   Skin: Skin is warm and dry. No rash noted.   Psychiatric: She has a normal mood and affect.     Results Reviewed:    Results from last 7 days  Lab Units 02/17/17  0350   WBC 10*3/mm3 9.59   HEMOGLOBIN g/dL 17.9*   PLATELETS 10*3/mm3 250       Results from last 7 days  Lab Units 02/17/17  0350   SODIUM mmol/L 137   POTASSIUM mmol/L 3.5   TOTAL CO2 mmol/L 29.0   CREATININE mg/dL 1.50*   GLUCOSE mg/dL 82   CALCIUM mg/dL 10.2     I personally reviewed EKGs which showed some slight lateral T-wave changes    I have personally reviewed and interpreted available lab data, radiology studies and ECG obtained at time of admission.     Assessment / Plan     Assessment/Problem List:   Principal Problem:    Chest pain  Active Problems:    COPD (chronic obstructive pulmonary disease)    " Depression    Anxiety    Presence of urostomy    Elevated lipase    Tobacco abuse    Chronic kidney disease, stage 3    Plan:  Miss Palomino presents with some sharp left-sided chest pain that woke her up.  Denies any previous exertional chest pain.  Enzymes are negative but she does have some nonspecific T-wave changes.  She has been given an aspirin which I will continue along with statin.  We'll check lipids.  I think it is reasonable to proceed with a stress test for further risk stratification.  She has an elevated lipase that is likely nonspecific and she exhibits no abdominal tenderness at all.  We'll recheck in the morning.  Did  her regarding her tobacco abuse and we'll provide a nicotine patch while here.    DVT prophylaxis: heparin   Code Status: Full  Admission Status: Patient will be admitted to OBSERVATION status, however if further evaluation or treatment plans warrant, status may change.  Based upon current information, I predict patient's care encounter to be less than or equal to 2 midnights.     Petr May MD 02/17/17 7:57 AM

## 2017-02-17 NOTE — PLAN OF CARE
Problem: Patient Care Overview (Adult)  Goal: Plan of Care Review    02/17/17 0937   Outcome Evaluation   Outcome Summary/Follow up Plan c/o stabbing pain in chest along with mild pain in Lt arm, chronic back pain, has urostomy to leg bag drain

## 2017-02-17 NOTE — ED PROVIDER NOTES
Subjective   HPI Comments: Darcy Palomino is a 51 y.o.female who presents to the ED with c/o CP. Pt was awoken from sleep at 0030 with a stabbing sensation across her chest that radiated to her back, a heaviness in her left arm and nausea. She states that her pain did not resolve prompting her to come to the ED and in the ED she states that her pain has been gradually improving and her pain does not worsen in any position or with exertion. She denies any fevers, chills, vomiting, diarrhea or other acute complaints.     Hx of COPD, CAD in family (twin sister, parents)     Pt is under increased stress as she is currently moving.     Patient is a 51 y.o. female presenting with chest pain.   History provided by:  Patient  Chest Pain   Pain location:  Substernal area  Pain quality: sharp    Pain radiates to:  Upper back  Pain severity:  Moderate  Onset quality:  Sudden  Duration: began at 0030.  Timing:  Constant  Progression:  Improving  Chronicity:  New  Context comment:  Awakening from sleep  Relieved by:  Nothing  Worsened by:  Nothing  Associated symptoms: nausea    Associated symptoms: no abdominal pain, no cough, no diaphoresis, no fatigue, no fever, no shortness of breath, no vomiting and no weakness  Numbness: heaviness in her arm.        Review of Systems   Constitutional: Negative for diaphoresis, fatigue and fever.   Respiratory: Negative for cough and shortness of breath.    Cardiovascular: Positive for chest pain.   Gastrointestinal: Positive for nausea. Negative for abdominal pain and vomiting.   Neurological: Negative for weakness. Numbness: heaviness in her arm.   All other systems reviewed and are negative.      Past Medical History   Diagnosis Date   • Anxiety    • Asthma    • Cervical cancer    • Chronic bronchitis    • COPD (chronic obstructive pulmonary disease)    • Depression    • Kidney disease        Allergies   Allergen Reactions   • Lidocaine    • Nsaids    • Toradol [Ketorolac Tromethamine]         Past Surgical History   Procedure Laterality Date   • Cystectomy     • Hysterectomy         Family History   Problem Relation Age of Onset   • Colon cancer Mother    • Stroke Father    • Stroke Brother    • Heart attack Maternal Uncle    • Bone cancer Maternal Grandmother        Social History     Social History   • Marital status: Single     Spouse name: N/A   • Number of children: N/A   • Years of education: N/A     Social History Main Topics   • Smoking status: Current Every Day Smoker   • Smokeless tobacco: None   • Alcohol use No   • Drug use: No   • Sexual activity: Defer     Other Topics Concern   • None     Social History Narrative   • None         Objective   Physical Exam   Constitutional: She is oriented to person, place, and time. She appears well-developed and well-nourished.  Non-toxic appearance. No distress.   HENT:   Head: Normocephalic and atraumatic.   Right Ear: External ear normal.   Left Ear: External ear normal.   Nose: Nose normal.   Eyes: Conjunctivae, EOM and lids are normal. Pupils are equal, round, and reactive to light. No scleral icterus.   Neck: Normal range of motion. Neck supple. No tracheal deviation present.   Cardiovascular: Normal rate, regular rhythm and normal heart sounds.  Exam reveals no gallop, no friction rub and no decreased pulses.    No murmur heard.  Pulmonary/Chest: Effort normal and breath sounds normal. No respiratory distress. She has no decreased breath sounds. She has no wheezes. She has no rhonchi. She has no rales. She exhibits no tenderness.   Abdominal: Soft. Normal appearance and bowel sounds are normal. She exhibits no distension. There is no tenderness. There is no rebound and no guarding.   Musculoskeletal: Normal range of motion. She exhibits no edema or deformity.   Equal calf size.    Lymphadenopathy:     She has no cervical adenopathy.   Neurological: She is alert and oriented to person, place, and time. She has normal strength. No cranial nerve  deficit or sensory deficit.   Skin: Skin is warm and dry. No rash noted. She is not diaphoretic.   Psychiatric: She has a normal mood and affect. Her speech is normal and behavior is normal. Judgment and thought content normal. Cognition and memory are normal.   Nursing note and vitals reviewed.      Procedures         ED Course  ED Course                     MDM  Number of Diagnoses or Management Options  Chest pain, unspecified type: new and requires workup  Elevated lipase: new and requires workup  Diagnosis management comments: Patient reports contiued chest pain.     EKG shows very subtle ST depression in lateral leads.     Cardiac enzymes times 2 are normal.     LIpase elevated to greater than 3 times upper limit of normal, but no significant tenderness to palpation in epigastrium.     Due to concerning story for possible coronary artery disease will admit.     Dr. May to admit.            Amount and/or Complexity of Data Reviewed  Clinical lab tests: ordered and reviewed  Tests in the radiology section of CPT®: ordered and reviewed  Decide to obtain previous medical records or to obtain history from someone other than the patient: yes  Obtain history from someone other than the patient: yes  Review and summarize past medical records: yes  Discuss the patient with other providers: yes  Independent visualization of images, tracings, or specimens: yes    Patient Progress  Patient progress: stable      Final diagnoses:   Elevated lipase   Chest pain, unspecified type       Documentation assistance provided by jeanmarie Godoy.  Information recorded by the scrandrew was done at my direction and has been verified and validated by me.     Zaheer Godoy  02/17/17 0427       Sharath Bahena MD  02/17/17 0705

## 2017-02-17 NOTE — PLAN OF CARE
Problem: Pain, Acute (Adult)  Goal: Identify Related Risk Factors and Signs and Symptoms  Outcome: Ongoing (interventions implemented as appropriate)    02/17/17 7638   Pain, Acute   Related Risk Factors (Acute Pain) persistent pain;positioning

## 2017-02-17 NOTE — PROGRESS NOTES
Discharge Planning Assessment  [facility]     Patient Name: Darcy Palomino  MRN: [id number]  Today's Date: 2/17/2017    Admit Date: 2/17/2017          Discharge Needs Assessment       02/17/17 1526    Living Environment    Lives With sibling(s)    Living Arrangements mobile home    Provides Primary Care For no one    Quality Of Family Relationships supportive    Able to Return to Prior Living Arrangements yes    Discharge Needs Assessment    Concerns To Be Addressed no discharge needs identified;denies needs/concerns at this time    Readmission Within The Last 30 Days no previous admission in last 30 days    Anticipated Changes Related to Illness none    Equipment Currently Used at Home none    Equipment Needed After Discharge none    Transportation Available car;family or friend will provide    Discharge Disposition home or self-care    Discharge Contact Information if Applicable [phone]    Discharge Planning Comments home with siblings to [facility]            Discharge Plan       02/17/17 1528    Case Management/Social Work Plan    Plan home with siblings to [facility]    Patient/Family In Agreement With Plan yes    Additional Comments Ms. palomino lives with her brother and sister in a mobile home in [place]. She was independent prior to this admission. She has used oxygen in the past and states she has equipment at home, however she does not know the provider. She states it was donated to her several years ago from a hospitalization that she doesn't recall from where. She states she needs some new supplies for her oxygen. Explained she can obtain DME info from the equipment and CM will assist her in getting any needed replacement supplies. She states she has ample support at home and outside oxygen supplies she has no concerns or needs at this time. CM will await Ms. Palomino to get us DME name.         Discharge Placement     No information found        Expected Discharge Date and Time      Expected Discharge Date Expected Discharge Time    Feb 18, 2017               Demographic Summary       02/17/17 1522    Referral Information    Admission Type observation    Arrived From home or self-care    Referral Source admission list;physician    Record Reviewed clinical discipline documentation;history and physical;medical record    Contact Information    Permission Granted to Share Information With     Primary Care Physician Information    Name Rubia Low            Functional Status       02/17/17 1523    Functional Status Current    Ambulation 0-->independent    Transferring 0-->independent    Toileting 0-->independent    Bathing 0-->independent    Dressing 0-->independent    Eating 0-->independent    Communication 0-->understands/communicates without difficulty    Swallowing (if score 2 or more for any item, consult Rehab Services) 0-->swallows foods/liquids without difficulty    Change in Functional Status Since Onset of Current Illness/Injury no    Functional Status Prior    Ambulation 0-->independent    Transferring 0-->independent    Toileting 0-->independent    Bathing 0-->independent    Dressing 0-->independent    Eating 0-->independent    Communication 0-->understands/communicates without difficulty    Swallowing 0-->swallows foods/liquids without difficulty    IADL    Medications independent   has rx drug coverage with no issues in obtaining or affording copays    Meal Preparation independent    Housekeeping independent    Laundry independent    Shopping independent    Oral Care independent    Activity Tolerance    Current Activity Limitations none    Cognitive/Perceptual/Developmental    Current Mental Status/Cognitive Functioning no deficits noted    Recent Changes in Mental Status/Cognitive Functioning no changes    Employment/Financial    Financial Concerns none   [facility] with no recent changes to coverage            Psychosocial     None             Abuse/Neglect     None            Legal     None            Substance Abuse       02/17/17 1527    Substance Use, Patient    Substance Use current tobacco use    Tobacco Type cigarettes, tobacco    Number of Cigarette Packs per Day 0.5    Readiness to Quit Tobacco Use thinking about quitting    Tobacco Cessation Education (provide if tobacco used with i the last 12 mos) yes    Date Smoking Cessation Information Given 02/17/17            Patient Forms     None          Lucille Armenta, RN

## 2017-02-18 VITALS
HEART RATE: 67 BPM | BODY MASS INDEX: 27.32 KG/M2 | RESPIRATION RATE: 18 BRPM | HEIGHT: 66 IN | TEMPERATURE: 97.3 F | SYSTOLIC BLOOD PRESSURE: 113 MMHG | WEIGHT: 170 LBS | DIASTOLIC BLOOD PRESSURE: 74 MMHG | OXYGEN SATURATION: 93 %

## 2017-02-18 LAB — LIPASE SERPL-CCNC: 70 U/L (ref 6–51)

## 2017-02-18 PROCEDURE — G0378 HOSPITAL OBSERVATION PER HR: HCPCS

## 2017-02-18 PROCEDURE — 99217 PR OBSERVATION CARE DISCHARGE MANAGEMENT: CPT | Performed by: INTERNAL MEDICINE

## 2017-02-18 PROCEDURE — 25010000002 HEPARIN (PORCINE) PER 1000 UNITS: Performed by: INTERNAL MEDICINE

## 2017-02-18 PROCEDURE — 96372 THER/PROPH/DIAG INJ SC/IM: CPT

## 2017-02-18 PROCEDURE — 83690 ASSAY OF LIPASE: CPT | Performed by: INTERNAL MEDICINE

## 2017-02-18 RX ADMIN — ESTRADIOL 1 MG: 0.5 TABLET ORAL at 08:23

## 2017-02-18 RX ADMIN — OXYCODONE HYDROCHLORIDE AND ACETAMINOPHEN 1 TABLET: 10; 325 TABLET ORAL at 06:13

## 2017-02-18 RX ADMIN — DULOXETINE 30 MG: 60 CAPSULE, DELAYED RELEASE ORAL at 08:24

## 2017-02-18 RX ADMIN — DULOXETINE 60 MG: 60 CAPSULE, DELAYED RELEASE ORAL at 08:23

## 2017-02-18 RX ADMIN — ASPIRIN 325 MG: 325 TABLET, DELAYED RELEASE ORAL at 08:23

## 2017-02-18 RX ADMIN — GABAPENTIN 800 MG: 400 CAPSULE ORAL at 06:12

## 2017-02-18 RX ADMIN — HEPARIN SODIUM 5000 UNITS: 5000 INJECTION, SOLUTION INTRAVENOUS; SUBCUTANEOUS at 06:12

## 2017-02-18 NOTE — PLAN OF CARE
Problem: Patient Care Overview (Adult)  Goal: Plan of Care Review  Outcome: Ongoing (interventions implemented as appropriate)    02/17/17 0937 02/18/17 0411   Coping/Psychosocial Response Interventions   Plan Of Care Reviewed With --  patient   Patient Care Overview   Progress no change --          Problem: Pain, Acute (Adult)  Goal: Identify Related Risk Factors and Signs and Symptoms  Outcome: Ongoing (interventions implemented as appropriate)  Goal: Acceptable Pain Control/Comfort Level  Outcome: Ongoing (interventions implemented as appropriate)

## 2017-02-18 NOTE — DISCHARGE SUMMARY
[facility] Medicine Services  DISCHARGE SUMMARY       Date of Admission: 2/17/2017  Date of Discharge:  2/18/2017  Primary Care Physician: Rubia Low, DO    Discharge Diagnoses:  Active Hospital Problems (** Indicates Principal Problem)    Diagnosis Date Noted   • **Chest pain [R07.9] 02/17/2017     Priority: Medium   • Elevated lipase [R74.8] 02/17/2017   • Tobacco abuse [Z72.0] 02/17/2017   • Chronic kidney disease, stage 3 [N18.3] 02/17/2017   • Presence of urostomy [Z93.6] 02/09/2017   • Anxiety [F41.9] 10/11/2016   • COPD (chronic obstructive pulmonary disease) [J44.9] 05/24/2016   • Depression [F32.9] 05/24/2016      Resolved Hospital Problems    Diagnosis Date Noted Date Resolved   No resolved problems to display.       Presenting Problem/History of Present Illness  Elevated lipase [R74.8]  Elevated lipase [R74.8]  Elevated lipase [R74.8]     Chief Complaint on Day of Discharge:  Chest pain    History of Present Illness on Day of Discharge:   She had a couple twinges of pain overnight but nothing compared to what brought her in.  She currently feels to her baseline and is ready go home.    Hospital Course  Patient is a 51 y.o. female with history of chronic kidney disease, depression, chronic pain, and ongoing tobacco abuse who presented to the emergency room with complaints of chest pain.  She initially woke up at 3 AM with sharp pain in her left chest which radiated to her back and is associated with some left arm heaviness.  In the emergency room on chronic enzymes were negative.  EKG showed some nonspecific T-wave changes with nothing prior to compare to.  She also had a minimally elevated lipase 184.  She was admitted for further evaluation.  She underwent a nuclear stress test which came back as a low risk is study for ischemia.  She did not have any further concerning chest pain symptoms and enzymes remained negative.  Her lipase also normalized and this is likely a  "nonspecific finding and she had no abdominal pain.  They've discharge she is back to baseline and anxious to get home.  I did  her at length regarding her tobacco abuse and says she is cutting down.  She can follow-up primary care physician in early March as are scheduled.    Procedures Performed         Consults:   Consults     No orders found for last 30 day(s).          Pertinent Test Results:  Stress test showed EF of 64% with normal myocardial perfusion and no evidence of ischemia    Chest x-ray showed no acute disease    Condition on Discharge:  Good    Physical Exam on Discharge:  Visit Vitals   • /74 (BP Location: Left arm, Patient Position: Lying)   • Pulse 67   • Temp 97.3 °F (36.3 °C) (Oral)   • Resp 18   • Ht 66\" (167.6 cm)   • Wt 170 lb (77.1 kg)   • SpO2 93%   • BMI 27.44 kg/m2     Physical Exam   Constitutional: She is oriented to person, place, and time. She appears well-developed and well-nourished.   HENT:   Head: Normocephalic.   Eyes: Pupils are equal, round, and reactive to light.   Cardiovascular: Normal rate, regular rhythm and normal heart sounds.    Pulmonary/Chest: Effort normal and breath sounds normal.   Slight end expiratory wheezes   Abdominal: Soft. Bowel sounds are normal. She exhibits no distension.   Musculoskeletal: She exhibits no edema.   Neurological: She is alert and oriented to person, place, and time.   Skin: Skin is warm and dry.   Psychiatric: She has a normal mood and affect.   Vitals reviewed.        Discharge Disposition  Home or Self Care    Discharge Medications   Darcy Palomino   Home Medication Instructions YVONNE:[id number]    Printed on:02/18/17 9871   Medication Information                      albuterol (PROVENTIL) (2.5 MG/3ML) 0.083% nebulizer solution  Take 2.5 mg by nebulization Every 6 (Six) Hours As Needed for wheezing or shortness of air.             clonazePAM (KLONOPIN) 1 MG tablet  Take 1mg PO BID prn             COMBIVENT RESPIMAT  " MCG/ACT inhaler  Inhale 1 puff 4 (Four) Times a Day.             DULoxetine (CYMBALTA) 30 MG capsule  Take 1 capsule by mouth Daily.             DULoxetine (CYMBALTA) 60 MG capsule  Take 1 capsule by mouth Daily.             estradiol (ESTRACE) 1 MG tablet  Take 1 tablet by mouth Daily.             gabapentin (NEURONTIN) 800 MG tablet  TAKE ONE TABLET BY MOUTH THREE TIMES A DAY             ondansetron (ZOFRAN) 4 MG tablet  Take 1 tablet by mouth Every 8 (Eight) Hours As Needed for nausea or vomiting.             oxyCODONE-acetaminophen (PERCOCET)  MG per tablet  Take 1 tablet by mouth Every 6 (Six) Hours As Needed for moderate pain (4-6).             promethazine (PHENERGAN) 25 MG tablet               tiZANidine (ZANAFLEX) 4 MG tablet  TAKE 1 TABLET BY MOUTH EVERY 8 HOURS             VENTOLIN  (90 BASE) MCG/ACT inhaler                   Discharge Diet:  as tolerated    Activity at Discharge:  as tolerated    Follow-up Appointments  Future Appointments  Date Time Provider Department Center   4/6/2017 8:15 AM Rubia Low DO [facility] None     Referrals and Follow-ups to Schedule     Follow-Up    As directed    Follow Up Details:  Keep PCP f/u in early March                  Petr May MD 02/18/17 8:45 AM    Time: Discharge 25 min    Please note that portions of this note may have been completed with a voice recognition program. Efforts were made to edit the dictations, but occasionally words are mistranscribed.

## 2017-02-22 DIAGNOSIS — R82.998 DARK URINE: Primary | ICD-10-CM

## 2017-04-05 ENCOUNTER — LAB (OUTPATIENT)
Dept: INTERNAL MEDICINE | Facility: CLINIC | Age: 52
End: 2017-04-05

## 2017-04-05 DIAGNOSIS — R82.998 DARK URINE: ICD-10-CM

## 2017-04-05 DIAGNOSIS — F41.9 ANXIETY: Primary | ICD-10-CM

## 2017-04-05 LAB
BILIRUB BLD-MCNC: NEGATIVE MG/DL
CLARITY, POC: CLEAR
COLOR UR: YELLOW
EXPIRATION DATE: ABNORMAL
GLUCOSE UR STRIP-MCNC: NEGATIVE MG/DL
KETONES UR QL: NEGATIVE
LEUKOCYTE EST, POC: ABNORMAL
Lab: ABNORMAL
NITRITE UR-MCNC: POSITIVE MG/ML
PH UR: 7 [PH] (ref 5–8)
PROT UR STRIP-MCNC: ABNORMAL MG/DL
RBC # UR STRIP: ABNORMAL /UL
SP GR UR: 1.01 (ref 1–1.03)
UROBILINOGEN UR QL: NORMAL

## 2017-04-05 PROCEDURE — 81003 URINALYSIS AUTO W/O SCOPE: CPT | Performed by: PHYSICIAN ASSISTANT

## 2017-04-05 RX ORDER — ALPRAZOLAM 1 MG/1
1 TABLET ORAL 2 TIMES DAILY PRN
Qty: 60 TABLET | Refills: 0 | Status: SHIPPED | OUTPATIENT
Start: 2017-04-05 | End: 2017-04-27 | Stop reason: SDUPTHER

## 2017-04-06 ENCOUNTER — TELEPHONE (OUTPATIENT)
Dept: INTERNAL MEDICINE | Facility: CLINIC | Age: 52
End: 2017-04-06

## 2017-04-06 RX ORDER — SULFAMETHOXAZOLE AND TRIMETHOPRIM 800; 160 MG/1; MG/1
1 TABLET ORAL 2 TIMES DAILY
Qty: 10 TABLET | Refills: 0 | Status: SHIPPED | OUTPATIENT
Start: 2017-04-06 | End: 2017-04-14

## 2017-04-06 NOTE — TELEPHONE ENCOUNTER
----- Message from Any Spaulding sent at 4/6/2017  1:58 PM EDT -----  Contact: JAKI GRECO CALLING FOR THE RESULTS OF HER UA. SHE CAN BE REACHED [phone]

## 2017-04-07 ENCOUNTER — OFFICE VISIT (OUTPATIENT)
Dept: INTERNAL MEDICINE | Facility: CLINIC | Age: 52
End: 2017-04-07

## 2017-04-07 VITALS
DIASTOLIC BLOOD PRESSURE: 78 MMHG | BODY MASS INDEX: 26.31 KG/M2 | SYSTOLIC BLOOD PRESSURE: 122 MMHG | WEIGHT: 163 LBS | RESPIRATION RATE: 16 BRPM | HEART RATE: 76 BPM

## 2017-04-07 DIAGNOSIS — Z13.6 SCREENING FOR CARDIOVASCULAR CONDITION: ICD-10-CM

## 2017-04-07 DIAGNOSIS — R23.2 HOT FLASHES: ICD-10-CM

## 2017-04-07 DIAGNOSIS — M79.2 NERVE PAIN: ICD-10-CM

## 2017-04-07 DIAGNOSIS — Z12.11 SCREENING FOR COLON CANCER: ICD-10-CM

## 2017-04-07 DIAGNOSIS — Z12.39 SCREENING FOR BREAST CANCER: ICD-10-CM

## 2017-04-07 DIAGNOSIS — F41.9 ANXIETY: ICD-10-CM

## 2017-04-07 DIAGNOSIS — F32.A DEPRESSION, UNSPECIFIED DEPRESSION TYPE: Primary | ICD-10-CM

## 2017-04-07 DIAGNOSIS — J44.9 CHRONIC OBSTRUCTIVE PULMONARY DISEASE, UNSPECIFIED COPD TYPE (HCC): ICD-10-CM

## 2017-04-07 LAB
ALBUMIN SERPL-MCNC: 4.6 G/DL (ref 3.2–4.8)
ALBUMIN/GLOB SERPL: 1.5 G/DL (ref 1.5–2.5)
ALP SERPL-CCNC: 68 U/L (ref 25–100)
ALT SERPL-CCNC: 20 U/L (ref 7–40)
AST SERPL-CCNC: 27 U/L (ref 0–33)
BILIRUB SERPL-MCNC: 0.2 MG/DL (ref 0.3–1.2)
BUN SERPL-MCNC: 29 MG/DL (ref 9–23)
BUN/CREAT SERPL: 22.3 (ref 7–25)
CALCIUM SERPL-MCNC: 10.4 MG/DL (ref 8.7–10.4)
CHLORIDE SERPL-SCNC: 106 MMOL/L (ref 99–109)
CHOLEST SERPL-MCNC: 238 MG/DL (ref 0–200)
CO2 SERPL-SCNC: 30 MMOL/L (ref 20–31)
CREAT SERPL-MCNC: 1.3 MG/DL (ref 0.6–1.3)
ERYTHROCYTE [DISTWIDTH] IN BLOOD BY AUTOMATED COUNT: 14.3 % (ref 11.3–14.5)
GLOBULIN SER CALC-MCNC: 3 GM/DL
GLUCOSE SERPL-MCNC: 95 MG/DL (ref 70–100)
HCT VFR BLD AUTO: 48.5 % (ref 34.5–44)
HDLC SERPL-MCNC: 56 MG/DL (ref 40–60)
HGB BLD-MCNC: 16.3 G/DL (ref 11.5–15.5)
LDLC SERPL CALC-MCNC: 149 MG/DL (ref 0–100)
MCH RBC QN AUTO: 35.1 PG (ref 27–31)
MCHC RBC AUTO-ENTMCNC: 33.6 G/DL (ref 32–36)
MCV RBC AUTO: 104.3 FL (ref 80–99)
PLATELET # BLD AUTO: 263 10*3/MM3 (ref 150–450)
POTASSIUM SERPL-SCNC: 6.2 MMOL/L (ref 3.5–5.5)
PROT SERPL-MCNC: 7.6 G/DL (ref 5.7–8.2)
RBC # BLD AUTO: 4.65 10*6/MM3 (ref 3.89–5.14)
SODIUM SERPL-SCNC: 138 MMOL/L (ref 132–146)
TRIGL SERPL-MCNC: 163 MG/DL (ref 0–150)
VLDLC SERPL CALC-MCNC: 32.6 MG/DL
WBC # BLD AUTO: 7.9 10*3/MM3 (ref 3.5–10.8)

## 2017-04-07 PROCEDURE — 36415 COLL VENOUS BLD VENIPUNCTURE: CPT | Performed by: INTERNAL MEDICINE

## 2017-04-07 PROCEDURE — 99214 OFFICE O/P EST MOD 30 MIN: CPT | Performed by: INTERNAL MEDICINE

## 2017-04-07 RX ORDER — TRIAMCINOLONE ACETONIDE 0.25 MG/G
OINTMENT TOPICAL 2 TIMES DAILY
Qty: 15 G | Refills: 1 | Status: SHIPPED | OUTPATIENT
Start: 2017-04-07 | End: 2017-08-17

## 2017-04-07 RX ORDER — DULOXETIN HYDROCHLORIDE 60 MG/1
60 CAPSULE, DELAYED RELEASE ORAL 2 TIMES DAILY
Qty: 60 CAPSULE | Refills: 3 | Status: SHIPPED | OUTPATIENT
Start: 2017-04-07 | End: 2017-08-31 | Stop reason: SDUPTHER

## 2017-04-07 RX ORDER — DULOXETIN HYDROCHLORIDE 30 MG/1
30 CAPSULE, DELAYED RELEASE ORAL DAILY
Qty: 30 CAPSULE | Refills: 5 | Status: CANCELLED | OUTPATIENT
Start: 2017-04-07

## 2017-04-07 RX ORDER — FLUTICASONE PROPIONATE 50 MCG
2 SPRAY, SUSPENSION (ML) NASAL DAILY
Qty: 1 EACH | Refills: 5 | Status: SHIPPED | OUTPATIENT
Start: 2017-04-07 | End: 2017-05-07

## 2017-04-07 RX ORDER — ESTRADIOL 1 MG/1
1 TABLET ORAL DAILY
Qty: 30 TABLET | Refills: 5 | Status: SHIPPED | OUTPATIENT
Start: 2017-04-07 | End: 2017-10-09 | Stop reason: SDUPTHER

## 2017-04-07 RX ORDER — DULOXETIN HYDROCHLORIDE 60 MG/1
60 CAPSULE, DELAYED RELEASE ORAL DAILY
Qty: 30 CAPSULE | Refills: 5 | Status: CANCELLED | OUTPATIENT
Start: 2017-04-07

## 2017-04-07 NOTE — PROGRESS NOTES
Subjective   Darcy Palomino is a 52 y.o. female.   Pt is here to follow up on COPD, neuropathy,hot flashes, anxiety and depression.         History of Present Illness   Pt is here to follow up on COPD, neuropathy,hot flashes, anxiety and depression.   Pt is currently taking Bactrim for UTI, several months ago IV infusions were set up to treat UTI but she was not compliant with this. She states the antibiotic makes her nauseated at times.   She follows with pain management who prescribes Zanaflex and neurontin   She also states she has been petting on her dogs and has noticed several areas of itching on her arms and low back intermittently.   She inquires about what to take for allergic rhinitis when needed as well.       The following portions of the patient's history were reviewed and updated as appropriate: allergies, current medications, past family history, past medical history, past social history, past surgical history and problem list.             Review of Systems   Constitutional: Negative.    HENT:        See HPI.    Eyes: Negative.    Respiratory:        See HPI.    Cardiovascular: Negative.    Gastrointestinal: Negative.    Endocrine: Negative.    Genitourinary: Negative.    Musculoskeletal: Negative.    Skin:        See HPI.    Allergic/Immunologic: Negative.    Neurological:        See HPI.    Hematological: Negative.    Psychiatric/Behavioral:        See HPI.               Objective   Physical Exam   Constitutional: She is oriented to person, place, and time. She appears well-developed and well-nourished.   HENT:   Head: Normocephalic and atraumatic.   Right Ear: External ear normal.   Left Ear: External ear normal.   Nose: Nose normal.   Mouth/Throat: Oropharynx is clear and moist.   Eyes: Conjunctivae and EOM are normal. Pupils are equal, round, and reactive to light.   Neck: Normal range of motion. Neck supple. No tracheal deviation present. No thyromegaly present.   Cardiovascular: Normal rate,  regular rhythm, normal heart sounds and intact distal pulses.    Pulmonary/Chest: Effort normal and breath sounds normal.   Abdominal: Soft. Bowel sounds are normal.   Neurological: She is alert and oriented to person, place, and time. She has normal reflexes.   Skin: Skin is warm and dry.   Psychiatric: She has a normal mood and affect.   Nursing note and vitals reviewed.              Assessment/Plan      Depression, unspecified depression type    Hot flashes  -     estradiol (ESTRACE) 1 MG tablet; Take 1 tablet by mouth Daily.    Nerve pain  -     DULoxetine (CYMBALTA) 60 MG capsule; Take 1 capsule by mouth 2 (Two) Times a Day.    Anxiety  -     Comprehensive Metabolic Panel  -     CBC (No Diff)    Chronic obstructive pulmonary disease, unspecified COPD type    Screening for breast cancer  -     Mammo Screening, Bilateral (Annually); Future    Screening for colon cancer  -     Amb referral for Screening Colonoscopy    Screening for cardiovascular condition  -     Lipid Panel    Other orders  -     Cancel: DULoxetine (CYMBALTA) 30 MG capsule; Take 1 capsule by mouth Daily.  -     Cancel: DULoxetine (CYMBALTA) 60 MG capsule; Take 1 capsule by mouth Daily.  -     fluticasone (FLONASE) 50 MCG/ACT nasal spray; 2 sprays into each nostril Daily for 30 days.  -     triamcinolone (KENALOG) 0.025 % ointment; Apply  topically 2 (Two) Times a Day.          1.) colonoscopy referral placed   2.) Mammogram referral placed   3.) Refill chronic meds   4.) change Cymbalta dose to 60 mg PO BID   5.) Check CBC,CMP , UDS ( has not fasted)   6.) Flonase 1-2 sprays up each nostril daily   7.) Triamcinolone cream 0.025% apply to affected area BID prn   8.) Take zofran prn with bactrim and make sure she eats before she takes bactrim

## 2017-04-10 ENCOUNTER — LAB (OUTPATIENT)
Dept: INTERNAL MEDICINE | Facility: CLINIC | Age: 52
End: 2017-04-10

## 2017-04-10 DIAGNOSIS — E87.5 SERUM POTASSIUM ELEVATED: ICD-10-CM

## 2017-04-10 DIAGNOSIS — E87.5 SERUM POTASSIUM ELEVATED: Primary | ICD-10-CM

## 2017-04-10 LAB
ALBUMIN SERPL-MCNC: 4.6 G/DL (ref 3.2–4.8)
ALBUMIN/GLOB SERPL: 1.5 G/DL (ref 1.5–2.5)
ALP SERPL-CCNC: 68 U/L (ref 25–100)
ALT SERPL-CCNC: 18 U/L (ref 7–40)
AST SERPL-CCNC: 22 U/L (ref 0–33)
BILIRUB SERPL-MCNC: 0.3 MG/DL (ref 0.3–1.2)
BUN SERPL-MCNC: 30 MG/DL (ref 9–23)
BUN/CREAT SERPL: 18.8 (ref 7–25)
CALCIUM SERPL-MCNC: 10 MG/DL (ref 8.7–10.4)
CHLORIDE SERPL-SCNC: 104 MMOL/L (ref 99–109)
CO2 SERPL-SCNC: 23 MMOL/L (ref 20–31)
CREAT SERPL-MCNC: 1.6 MG/DL (ref 0.6–1.3)
GLOBULIN SER CALC-MCNC: 3.1 GM/DL
GLUCOSE SERPL-MCNC: 92 MG/DL (ref 70–100)
POTASSIUM SERPL-SCNC: 5 MMOL/L (ref 3.5–5.5)
PROT SERPL-MCNC: 7.7 G/DL (ref 5.7–8.2)
SODIUM SERPL-SCNC: 136 MMOL/L (ref 132–146)

## 2017-04-10 PROCEDURE — 36415 COLL VENOUS BLD VENIPUNCTURE: CPT | Performed by: INTERNAL MEDICINE

## 2017-04-13 ENCOUNTER — TELEPHONE (OUTPATIENT)
Dept: INTERNAL MEDICINE | Facility: CLINIC | Age: 52
End: 2017-04-13

## 2017-04-13 NOTE — TELEPHONE ENCOUNTER
Called numerous times all weekend including leaving a message to call me back, finally got in touch with patient on Monday 4/10, she repeated her potassium level which was optimal at 5, likely tube was hemolyzed on Friday.

## 2017-04-13 NOTE — TELEPHONE ENCOUNTER
----- Message from Rina Silver sent at 4/13/2017 10:32 AM EDT -----  AFTER HOURS CALL 4/8/17 10:18 AN    [facility] LAB    CRITICAL POTASSIUM LEVEL

## 2017-04-13 NOTE — TELEPHONE ENCOUNTER
Spoke with pt and informed her that UA was not ordered and I will let Dr. Low know to put order in. Pt verbalized understanding. Please put order in for UA and urine culture.

## 2017-04-14 ENCOUNTER — OFFICE VISIT (OUTPATIENT)
Dept: INTERNAL MEDICINE | Facility: CLINIC | Age: 52
End: 2017-04-14

## 2017-04-14 VITALS
DIASTOLIC BLOOD PRESSURE: 80 MMHG | SYSTOLIC BLOOD PRESSURE: 140 MMHG | BODY MASS INDEX: 26.31 KG/M2 | TEMPERATURE: 98.1 F | WEIGHT: 163 LBS | HEART RATE: 82 BPM | OXYGEN SATURATION: 100 % | RESPIRATION RATE: 18 BRPM

## 2017-04-14 DIAGNOSIS — J01.00 SUBACUTE MAXILLARY SINUSITIS: Primary | ICD-10-CM

## 2017-04-14 DIAGNOSIS — N39.0 URINARY TRACT INFECTION WITHOUT HEMATURIA, SITE UNSPECIFIED: ICD-10-CM

## 2017-04-14 DIAGNOSIS — Z93.6 PRESENCE OF UROSTOMY (HCC): ICD-10-CM

## 2017-04-14 DIAGNOSIS — J40 BRONCHITIS: ICD-10-CM

## 2017-04-14 DIAGNOSIS — N39.0 URINARY TRACT INFECTION WITHOUT HEMATURIA, SITE UNSPECIFIED: Primary | ICD-10-CM

## 2017-04-14 DIAGNOSIS — J30.2 SEASONAL ALLERGIC RHINITIS, UNSPECIFIED ALLERGIC RHINITIS TRIGGER: ICD-10-CM

## 2017-04-14 PROBLEM — Z85.51 HX OF BLADDER CANCER: Status: ACTIVE | Noted: 2017-04-14

## 2017-04-14 LAB
BILIRUB BLD-MCNC: NEGATIVE MG/DL
CLARITY, POC: ABNORMAL
COLOR UR: YELLOW
EXPIRATION DATE: ABNORMAL
GLUCOSE UR STRIP-MCNC: NEGATIVE MG/DL
KETONES UR QL: NEGATIVE
LEUKOCYTE EST, POC: NEGATIVE
Lab: ABNORMAL
NITRITE UR-MCNC: NEGATIVE MG/ML
PH UR: 9 [PH] (ref 5–8)
PROT UR STRIP-MCNC: NEGATIVE MG/DL
RBC # UR STRIP: NEGATIVE /UL
SP GR UR: 1.01 (ref 1–1.03)
UROBILINOGEN UR QL: NORMAL

## 2017-04-14 PROCEDURE — 99214 OFFICE O/P EST MOD 30 MIN: CPT | Performed by: PHYSICIAN ASSISTANT

## 2017-04-14 PROCEDURE — 96372 THER/PROPH/DIAG INJ SC/IM: CPT | Performed by: PHYSICIAN ASSISTANT

## 2017-04-14 PROCEDURE — 81002 URINALYSIS NONAUTO W/O SCOPE: CPT | Performed by: PHYSICIAN ASSISTANT

## 2017-04-14 RX ORDER — AMOXICILLIN AND CLAVULANATE POTASSIUM 875; 125 MG/1; MG/1
1 TABLET, FILM COATED ORAL 2 TIMES DAILY
Qty: 20 TABLET | Refills: 0 | Status: SHIPPED | OUTPATIENT
Start: 2017-04-14 | End: 2017-04-27

## 2017-04-14 RX ORDER — TRIAMCINOLONE ACETONIDE 40 MG/ML
40 INJECTION, SUSPENSION INTRA-ARTICULAR; INTRAMUSCULAR ONCE
Status: COMPLETED | OUTPATIENT
Start: 2017-04-14 | End: 2017-04-14

## 2017-04-14 RX ADMIN — TRIAMCINOLONE ACETONIDE 40 MG: 40 INJECTION, SUSPENSION INTRA-ARTICULAR; INTRAMUSCULAR at 11:15

## 2017-04-14 NOTE — PROGRESS NOTES
Subjective   Darcy Palomino is a 52 y.o. female.   Chief Complaint   Patient presents with   • Cough   • URI   • Sinusitis   • Headache   • Blister     mouth     History of Present Illness   Pt complains of several days of blisters in mouth, headache migraine, sinus pressure x 1 week, watery eyes.  Using flonase, took 5 tylenol.    Urine continues to have odor and burning at stoma.  Sees  urology, not seen in a while.  Hx of bladder cancer and had removal.    Down to 4 cig per day.  The following portions of the patient's history were reviewed and updated as appropriate: allergies, current medications, past medical history and problem list.    Review of Systems   Constitutional: Positive for chills. Negative for fever.   HENT: Positive for rhinorrhea. Negative for sore throat.    Respiratory: Positive for cough.        Objective   Physical Exam   Constitutional: She appears well-developed and well-nourished.   HENT:   Head: Normocephalic and atraumatic.   Right Ear: External ear normal.   Left Ear: External ear normal.   Nose: Right sinus exhibits no maxillary sinus tenderness and no frontal sinus tenderness. Left sinus exhibits no maxillary sinus tenderness and no frontal sinus tenderness.   Mouth/Throat: Posterior oropharyngeal erythema present. Tonsillar exudate.   Cerumen impaction bilaterally   Eyes: Lids are normal. Right eye exhibits no chemosis (mild) and no discharge. Left eye exhibits no chemosis (mild) and no discharge. Right conjunctiva is injected (mild). Left conjunctiva is injected (mild).   Cardiovascular: Normal rate, regular rhythm and normal heart sounds.  Exam reveals no gallop and no friction rub.    No murmur heard.  Pulmonary/Chest: Effort normal. No tachypnea. No respiratory distress. She has decreased breath sounds. She has wheezes. She has rhonchi. She has no rales.   Psychiatric: She has a normal mood and affect.   Vitals reviewed.      Assessment/Plan   Darcy was seen today for cough,  uri, sinusitis, headache and blister.    Diagnoses and all orders for this visit:    Subacute maxillary sinusitis  -     amoxicillin-clavulanate (AUGMENTIN) 875-125 MG per tablet; Take 1 tablet by mouth 2 (Two) Times a Day.  -     triamcinolone acetonide (KENALOG-40) injection 40 mg; Inject 1 mL into the shoulder, thigh, or buttocks 1 (One) Time.    Urinary tract infection without hematuria, site unspecified  -     Cancel: Urine Culture  -     Urine Culture  -     amoxicillin-clavulanate (AUGMENTIN) 875-125 MG per tablet; Take 1 tablet by mouth 2 (Two) Times a Day.  -     POC Urinalysis Dipstick    Bronchitis  -     amoxicillin-clavulanate (AUGMENTIN) 875-125 MG per tablet; Take 1 tablet by mouth 2 (Two) Times a Day.  -     triamcinolone acetonide (KENALOG-40) injection 40 mg; Inject 1 mL into the shoulder, thigh, or buttocks 1 (One) Time.    Seasonal allergic rhinitis, unspecified allergic rhinitis trigger  -     triamcinolone acetonide (KENALOG-40) injection 40 mg; Inject 1 mL into the shoulder, thigh, or buttocks 1 (One) Time.    Presence of urostomy        Symptoms seem to be allergy related with water eyes, rhinorrhea.  Pt doesn't appear to have UTI at this time.

## 2017-04-16 LAB
BACTERIA UR CULT: NORMAL
BACTERIA UR CULT: NORMAL

## 2017-04-27 ENCOUNTER — OFFICE VISIT (OUTPATIENT)
Dept: INTERNAL MEDICINE | Facility: CLINIC | Age: 52
End: 2017-04-27

## 2017-04-27 VITALS
SYSTOLIC BLOOD PRESSURE: 122 MMHG | WEIGHT: 163 LBS | TEMPERATURE: 97.3 F | DIASTOLIC BLOOD PRESSURE: 78 MMHG | RESPIRATION RATE: 18 BRPM | BODY MASS INDEX: 26.31 KG/M2 | HEART RATE: 80 BPM

## 2017-04-27 DIAGNOSIS — J06.9 URTI (ACUTE UPPER RESPIRATORY INFECTION): Primary | ICD-10-CM

## 2017-04-27 DIAGNOSIS — F41.9 ANXIETY: ICD-10-CM

## 2017-04-27 PROCEDURE — 99214 OFFICE O/P EST MOD 30 MIN: CPT | Performed by: INTERNAL MEDICINE

## 2017-04-27 RX ORDER — AMOXICILLIN 500 MG/1
500 CAPSULE ORAL 2 TIMES DAILY
Qty: 20 CAPSULE | Refills: 0 | Status: SHIPPED | OUTPATIENT
Start: 2017-04-27 | End: 2017-06-15

## 2017-04-27 RX ORDER — ALPRAZOLAM 1 MG/1
1 TABLET ORAL 3 TIMES DAILY PRN
Qty: 90 TABLET | Refills: 0 | Status: SHIPPED | OUTPATIENT
Start: 2017-04-27 | End: 2017-05-31 | Stop reason: SDUPTHER

## 2017-04-27 NOTE — PROGRESS NOTES
Subjective   Darcy Plaomino is a 52 y.o. female.   Pt presents today with URTI symptoms.           History of Present Illness   Pt presents today with sore throat and chills. She did not take her temp. She states symptoms started yesterday with maxillary sinus congestion which then developed into drainage and nonproductive cough, she has also had periods of muscle aches.   She has been taking Tylenol prn with no relief.   She also states she is having anxiety that is controlled with her current dose of Xanax, but she is having to take it TID on most days instead of  BID.  She has increased stress from her son and sister, along with multiple other family issues.       The following portions of the patient's history were reviewed and updated as appropriate: allergies, current medications, past family history, past medical history, past social history, past surgical history and problem list.             Review of Systems   Constitutional:        See HPI.    HENT: Negative.    Eyes: Negative.    Respiratory:        See HPI.    Cardiovascular: Negative.    Gastrointestinal: Negative.    Endocrine: Negative.    Genitourinary: Negative.    Musculoskeletal: Negative.    Skin: Negative.    Allergic/Immunologic: Negative.    Neurological: Negative.    Hematological: Negative.    Psychiatric/Behavioral:        See HPI.               Objective   Physical Exam   Constitutional: She is oriented to person, place, and time. She appears well-developed and well-nourished.   HENT:   Head: Normocephalic and atraumatic.   Nose: Nose normal.   Bilateral ear effusions   Moderate oropharyngeal drainage.    Eyes: Conjunctivae and EOM are normal. Pupils are equal, round, and reactive to light.   Neck: Normal range of motion. Neck supple.   Cardiovascular: Normal rate, regular rhythm, normal heart sounds and intact distal pulses.    Pulmonary/Chest: Effort normal.   Minimal rhonchi noted in bases.    Abdominal: Soft. Bowel sounds are normal.    Neurological: She is alert and oriented to person, place, and time. She has normal reflexes.   Skin: Skin is warm and dry.   Psychiatric: She has a normal mood and affect.   Nursing note and vitals reviewed.             Assessment/Plan   URTI (acute upper respiratory infection)  -     amoxicillin (AMOXIL) 500 MG capsule; Take 1 capsule by mouth 2 (Two) Times a Day.    Anxiety  -     ALPRAZolam (XANAX) 1 MG tablet; Take 1 tablet by mouth 3 (Three) Times a Day As Needed for Anxiety.        1.) Amoxicillin 500 mg PO BID X 10 days with no refills. How to dose and possible s/e discussed.   2.) Increase Xanax 1 mg PO TID prn , how to dose and possible s/e discussed.

## 2017-05-02 ENCOUNTER — TELEPHONE (OUTPATIENT)
Dept: INTERNAL MEDICINE | Facility: CLINIC | Age: 52
End: 2017-05-02

## 2017-05-09 ENCOUNTER — TELEPHONE (OUTPATIENT)
Dept: INTERNAL MEDICINE | Facility: CLINIC | Age: 52
End: 2017-05-09

## 2017-05-10 ENCOUNTER — TELEPHONE (OUTPATIENT)
Dept: INTERNAL MEDICINE | Facility: CLINIC | Age: 52
End: 2017-05-10

## 2017-05-10 DIAGNOSIS — R11.0 NAUSEA: Primary | ICD-10-CM

## 2017-05-10 RX ORDER — ONDANSETRON 4 MG/1
4 TABLET, FILM COATED ORAL EVERY 8 HOURS PRN
Qty: 20 TABLET | Refills: 3 | Status: SHIPPED | OUTPATIENT
Start: 2017-05-10 | End: 2017-08-17 | Stop reason: ALTCHOICE

## 2017-05-16 ENCOUNTER — OFFICE VISIT (OUTPATIENT)
Dept: INTERNAL MEDICINE | Facility: CLINIC | Age: 52
End: 2017-05-16

## 2017-05-16 VITALS
DIASTOLIC BLOOD PRESSURE: 88 MMHG | SYSTOLIC BLOOD PRESSURE: 166 MMHG | WEIGHT: 169 LBS | HEART RATE: 90 BPM | TEMPERATURE: 97.8 F | RESPIRATION RATE: 21 BRPM | BODY MASS INDEX: 27.28 KG/M2

## 2017-05-16 DIAGNOSIS — M54.50 CHRONIC MIDLINE LOW BACK PAIN WITHOUT SCIATICA: ICD-10-CM

## 2017-05-16 DIAGNOSIS — N18.30 CHRONIC KIDNEY DISEASE, STAGE 3 (HCC): Primary | ICD-10-CM

## 2017-05-16 DIAGNOSIS — R01.1 HEART MURMUR: ICD-10-CM

## 2017-05-16 DIAGNOSIS — G89.29 CHRONIC MIDLINE LOW BACK PAIN WITHOUT SCIATICA: ICD-10-CM

## 2017-05-16 DIAGNOSIS — J43.1 PANLOBULAR EMPHYSEMA (HCC): ICD-10-CM

## 2017-05-16 DIAGNOSIS — F41.9 ANXIETY: ICD-10-CM

## 2017-05-16 DIAGNOSIS — I10 ESSENTIAL HYPERTENSION: ICD-10-CM

## 2017-05-16 PROCEDURE — 99214 OFFICE O/P EST MOD 30 MIN: CPT | Performed by: INTERNAL MEDICINE

## 2017-05-16 RX ORDER — PROMETHAZINE HYDROCHLORIDE 25 MG/1
25 TABLET ORAL EVERY 8 HOURS PRN
Qty: 30 TABLET | Refills: 1 | Status: SHIPPED | OUTPATIENT
Start: 2017-05-16 | End: 2017-08-17

## 2017-05-16 RX ORDER — HYDROCHLOROTHIAZIDE 25 MG/1
25 TABLET ORAL DAILY
Qty: 30 TABLET | Refills: 5 | Status: SHIPPED | OUTPATIENT
Start: 2017-05-16

## 2017-05-17 LAB
BUN SERPL-MCNC: 24 MG/DL (ref 9–23)
BUN/CREAT SERPL: 18.5 (ref 7–25)
CALCIUM SERPL-MCNC: 9.6 MG/DL (ref 8.7–10.4)
CHLORIDE SERPL-SCNC: 111 MMOL/L (ref 99–109)
CO2 SERPL-SCNC: 27 MMOL/L (ref 20–31)
CREAT SERPL-MCNC: 1.3 MG/DL (ref 0.6–1.3)
GLUCOSE SERPL-MCNC: 104 MG/DL (ref 70–100)
POTASSIUM SERPL-SCNC: 4.4 MMOL/L (ref 3.5–5.5)
SODIUM SERPL-SCNC: 142 MMOL/L (ref 132–146)

## 2017-05-26 PROCEDURE — 87147 CULTURE TYPE IMMUNOLOGIC: CPT | Performed by: FAMILY MEDICINE

## 2017-05-26 PROCEDURE — 87186 SC STD MICRODIL/AGAR DIL: CPT | Performed by: FAMILY MEDICINE

## 2017-05-26 PROCEDURE — 87070 CULTURE OTHR SPECIMN AEROBIC: CPT | Performed by: FAMILY MEDICINE

## 2017-05-26 PROCEDURE — 87205 SMEAR GRAM STAIN: CPT | Performed by: FAMILY MEDICINE

## 2017-05-26 PROCEDURE — 87077 CULTURE AEROBIC IDENTIFY: CPT | Performed by: FAMILY MEDICINE

## 2017-05-29 ENCOUNTER — TELEPHONE (OUTPATIENT)
Dept: URGENT CARE | Facility: CLINIC | Age: 52
End: 2017-05-29

## 2017-05-30 ENCOUNTER — TELEPHONE (OUTPATIENT)
Dept: INTERNAL MEDICINE | Facility: CLINIC | Age: 52
End: 2017-05-30

## 2017-05-31 ENCOUNTER — TELEPHONE (OUTPATIENT)
Dept: INTERNAL MEDICINE | Facility: CLINIC | Age: 52
End: 2017-05-31

## 2017-05-31 DIAGNOSIS — F41.9 ANXIETY: ICD-10-CM

## 2017-05-31 RX ORDER — ALPRAZOLAM 1 MG/1
1 TABLET ORAL 3 TIMES DAILY PRN
Qty: 90 TABLET | Refills: 0 | Status: SHIPPED | OUTPATIENT
Start: 2017-05-31 | End: 2017-07-03 | Stop reason: SDUPTHER

## 2017-06-01 ENCOUNTER — TELEPHONE (OUTPATIENT)
Dept: URGENT CARE | Facility: CLINIC | Age: 52
End: 2017-06-01

## 2017-06-01 NOTE — TELEPHONE ENCOUNTER
Spoke to patient re: wound culture results per Dr. Fuentes.  Patient states that her wound has healed.

## 2017-06-05 ENCOUNTER — TELEPHONE (OUTPATIENT)
Dept: INTERNAL MEDICINE | Facility: CLINIC | Age: 52
End: 2017-06-05

## 2017-06-05 DIAGNOSIS — Z79.899 HIGH RISK MEDICATION USE: Primary | ICD-10-CM

## 2017-06-05 NOTE — TELEPHONE ENCOUNTER
----- Message from Wendy Lewis sent at 6/5/2017 11:18 AM EDT -----  Contact: SON  PATIENTS SON SAYS THAT SHE MAYBE ABUSING THE PRESCRIPTION MEDICINE THAT SHE IS GETTING AND MAYBE SELLING THEM TO THE PUBLIC. PATIENTS SON THAT HE DOESN'T THINK THAT SHE SHOULD HAVE THEM AND SHE SHOULD BE CALLED IN FOR A PILL COUNT. THANK YOU.

## 2017-06-12 NOTE — TELEPHONE ENCOUNTER
Unfortunately you know the drill, call in for pill count and UDS, if can not do this, future controlled meds will not be prescribed.     I have ordered UDS.

## 2017-06-15 ENCOUNTER — OFFICE VISIT (OUTPATIENT)
Dept: INTERNAL MEDICINE | Facility: CLINIC | Age: 52
End: 2017-06-15

## 2017-06-15 VITALS
RESPIRATION RATE: 20 BRPM | TEMPERATURE: 98.1 F | SYSTOLIC BLOOD PRESSURE: 136 MMHG | BODY MASS INDEX: 26.31 KG/M2 | DIASTOLIC BLOOD PRESSURE: 86 MMHG | HEART RATE: 84 BPM | WEIGHT: 163 LBS

## 2017-06-15 DIAGNOSIS — N39.0 URINARY TRACT INFECTION WITHOUT HEMATURIA, SITE UNSPECIFIED: Primary | ICD-10-CM

## 2017-06-15 LAB
BILIRUB BLD-MCNC: NEGATIVE MG/DL
CLARITY, POC: ABNORMAL
COLOR UR: YELLOW
EXPIRATION DATE: ABNORMAL
GLUCOSE UR STRIP-MCNC: NEGATIVE MG/DL
KETONES UR QL: NEGATIVE
LEUKOCYTE EST, POC: ABNORMAL
Lab: ABNORMAL
NITRITE UR-MCNC: POSITIVE MG/ML
PH UR: 8 [PH] (ref 5–8)
PROT UR STRIP-MCNC: NEGATIVE MG/DL
RBC # UR STRIP: ABNORMAL /UL
SP GR UR: 1 (ref 1–1.03)
UROBILINOGEN UR QL: NORMAL

## 2017-06-15 PROCEDURE — 99213 OFFICE O/P EST LOW 20 MIN: CPT | Performed by: INTERNAL MEDICINE

## 2017-06-15 PROCEDURE — 81003 URINALYSIS AUTO W/O SCOPE: CPT | Performed by: INTERNAL MEDICINE

## 2017-06-15 RX ORDER — SULFAMETHOXAZOLE AND TRIMETHOPRIM 800; 160 MG/1; MG/1
1 TABLET ORAL 2 TIMES DAILY
Qty: 14 TABLET | Refills: 0 | Status: SHIPPED | OUTPATIENT
Start: 2017-06-15 | End: 2017-07-07

## 2017-06-15 NOTE — PROGRESS NOTES
Subjective   Darcy Palomino is a 52 y.o. female.   Pt presents today with UTI symptoms.         History of Present Illness   Pt presents today with UTI symptoms. She states for 2-3 days she has had malodorous urine. She denies fever or chills but does state at times she felt flushed. She has CKD III and follows with Dr. Johnson for this. Her last UTI was 2 months ago and states the bacteria was covered with Bactrim.       The following portions of the patient's history were reviewed and updated as appropriate: allergies, current medications, past family history, past medical history, past social history, past surgical history and problem list.           Review of Systems   Constitutional: Negative.    HENT: Negative.    Eyes: Negative.    Respiratory: Negative.    Cardiovascular: Negative.    Gastrointestinal: Negative.    Endocrine: Negative.    Genitourinary:        See HPI.    Musculoskeletal: Negative.    Skin: Negative.    Allergic/Immunologic: Negative.    Neurological: Negative.    Hematological: Negative.    Psychiatric/Behavioral: Negative.            Objective   Physical Exam   Constitutional: She is oriented to person, place, and time. She appears well-developed and well-nourished.   HENT:   Head: Normocephalic and atraumatic.   Right Ear: External ear normal.   Left Ear: External ear normal.   Nose: Nose normal.   Mouth/Throat: Oropharynx is clear and moist.   Eyes: Conjunctivae and EOM are normal. Pupils are equal, round, and reactive to light.   Neck: Normal range of motion. Neck supple. No tracheal deviation present. No thyromegaly present.   Cardiovascular: Normal rate, regular rhythm, normal heart sounds and intact distal pulses.    Pulmonary/Chest: Effort normal and breath sounds normal.   Abdominal: Soft. Bowel sounds are normal.   Urosotomy bag shows urine to be light yellow.    Neurological: She is alert and oriented to person, place, and time. She has normal reflexes.   Skin: Skin is warm and  dry.   Psychiatric: She has a normal mood and affect.   Nursing note and vitals reviewed.           Assessment/Plan    Urinary tract infection without hematuria, site unspecified  -     sulfamethoxazole-trimethoprim (BACTRIM DS,SEPTRA DS) 800-160 MG per tablet; Take 1 tablet by mouth 2 (Two) Times a Day.  -     POC Urinalysis Dipstick, Automated  -     Urine Culture  -     Urine Culture  -     Urine Drug Screen  -     POC Urinalysis Dipstick, Multipro        1.) Bactrim DS 1 tab PO BID X 7 days , how to dose and possible s/e discussed.   2.) Urinalysis with Culture   3.) UDS     * At end of visit Pt then asked for Toradol shot for migraine? Even though there were no complaints of this during the entire exam. I did not give Toradol, for one reason among many, is the fact she has stage III chronic kidney disease.   She also requested her controlled mediations be refilled today, I did do a UDS off her U/A today, but instructed before I refilled controlled medications she has to bring her bottle in for a pill count, she is not due to run out until next week anyway.

## 2017-06-18 LAB
BACTERIA UR CULT: NORMAL
BACTERIA UR CULT: NORMAL

## 2017-06-29 NOTE — TELEPHONE ENCOUNTER
Patient has not returned my calls.  I am going to mail out the letter you dictated to the patient today.

## 2017-07-03 ENCOUNTER — TELEPHONE (OUTPATIENT)
Dept: INTERNAL MEDICINE | Facility: CLINIC | Age: 52
End: 2017-07-03

## 2017-07-03 ENCOUNTER — OFFICE VISIT (OUTPATIENT)
Dept: INTERNAL MEDICINE | Facility: CLINIC | Age: 52
End: 2017-07-03

## 2017-07-03 VITALS
SYSTOLIC BLOOD PRESSURE: 128 MMHG | RESPIRATION RATE: 19 BRPM | HEART RATE: 90 BPM | BODY MASS INDEX: 26.79 KG/M2 | WEIGHT: 166 LBS | TEMPERATURE: 97.9 F | DIASTOLIC BLOOD PRESSURE: 88 MMHG

## 2017-07-03 DIAGNOSIS — F41.9 ANXIETY: ICD-10-CM

## 2017-07-03 DIAGNOSIS — J40 BRONCHITIS: Primary | ICD-10-CM

## 2017-07-03 PROCEDURE — 99214 OFFICE O/P EST MOD 30 MIN: CPT | Performed by: INTERNAL MEDICINE

## 2017-07-03 RX ORDER — AZITHROMYCIN 250 MG/1
TABLET, FILM COATED ORAL
Qty: 6 TABLET | Refills: 0 | Status: SHIPPED | OUTPATIENT
Start: 2017-07-03 | End: 2017-07-07

## 2017-07-03 RX ORDER — ALPRAZOLAM 1 MG/1
1 TABLET ORAL 3 TIMES DAILY PRN
Qty: 90 TABLET | Refills: 0 | Status: SHIPPED | OUTPATIENT
Start: 2017-07-03 | End: 2017-08-17

## 2017-07-03 NOTE — TELEPHONE ENCOUNTER
----- Message from Delmi Gomez MA sent at 7/3/2017  1:27 PM EDT -----  Requesting a refill   Xanax   1 mg TID        Darcy  - [phone]    Mary Miller

## 2017-07-03 NOTE — PROGRESS NOTES
Subjective   Darcy Palomino is a 52 y.o. female.     History of Present Illness   Has had a two day history of cough, congestion and sob.  She has had no fever, but has had some dark sputum.  She has a history of chronic bronchitis and still smokes.  No sore throat, no ear ache.    The following portions of the patient's history were reviewed and updated as appropriate: allergies, current medications, past medical history and problem list.    Review of Systems   Constitutional: Positive for fatigue. Negative for chills and fever.   HENT: Positive for congestion. Negative for ear pain, postnasal drip and sinus pressure.    Eyes: Negative.    Respiratory: Negative for cough, chest tightness, shortness of breath and wheezing.    Cardiovascular: Negative.  Negative for chest pain, palpitations and leg swelling.   Gastrointestinal: Negative.  Negative for abdominal distention and abdominal pain.   Genitourinary:        Recently finished antibiotics for UTI.       Objective   Physical Exam   HENT:   Mouth/Throat: Oropharynx is clear and moist.   Cardiovascular: Normal rate and regular rhythm.  Exam reveals no gallop and no friction rub.    Murmur (small systollic murmur.) heard.  Pulmonary/Chest: Effort normal. No respiratory distress. She has no wheezes. She has rales (and a few rhonchi.). She exhibits no tenderness.   Abdominal: Soft. Bowel sounds are normal.   Nursing note and vitals reviewed.      Assessment/Plan   Darcy was seen today for cough.    Diagnoses and all orders for this visit:    Bronchitis  -     azithromycin (ZITHROMAX Z-PARVIN) 250 MG tablet; Take 2 tablets the first day, then 1 tablet daily for 4 days.    Anxiety  -     ALPRAZolam (XANAX) 1 MG tablet; Take 1 tablet by mouth 3 (Three) Times a Day As Needed for Anxiety.    Will treat with a zpac.  Cannot give cough med with a narcotic since she is followed at pain clinic.  She will use otc cough syrup.  Call if not better.

## 2017-07-07 ENCOUNTER — OFFICE VISIT (OUTPATIENT)
Dept: INTERNAL MEDICINE | Facility: CLINIC | Age: 52
End: 2017-07-07

## 2017-07-07 VITALS
TEMPERATURE: 98.1 F | DIASTOLIC BLOOD PRESSURE: 72 MMHG | BODY MASS INDEX: 27.18 KG/M2 | HEART RATE: 88 BPM | WEIGHT: 168.38 LBS | SYSTOLIC BLOOD PRESSURE: 118 MMHG

## 2017-07-07 DIAGNOSIS — J44.9 CHRONIC OBSTRUCTIVE PULMONARY DISEASE, UNSPECIFIED COPD TYPE (HCC): ICD-10-CM

## 2017-07-07 DIAGNOSIS — F32.A DEPRESSION, UNSPECIFIED DEPRESSION TYPE: ICD-10-CM

## 2017-07-07 DIAGNOSIS — N18.30 CHRONIC KIDNEY DISEASE, STAGE 3 (HCC): ICD-10-CM

## 2017-07-07 DIAGNOSIS — I10 ESSENTIAL HYPERTENSION: Primary | ICD-10-CM

## 2017-07-07 DIAGNOSIS — F41.9 ANXIETY: ICD-10-CM

## 2017-07-07 DIAGNOSIS — R07.9 CHEST PAIN, UNSPECIFIED TYPE: ICD-10-CM

## 2017-07-07 DIAGNOSIS — J43.1 PANLOBULAR EMPHYSEMA (HCC): ICD-10-CM

## 2017-07-07 PROBLEM — I20.8 ATYPICAL ANGINA (HCC): Status: ACTIVE | Noted: 2017-07-07

## 2017-07-07 PROCEDURE — 99214 OFFICE O/P EST MOD 30 MIN: CPT | Performed by: INTERNAL MEDICINE

## 2017-07-07 PROCEDURE — 93000 ELECTROCARDIOGRAM COMPLETE: CPT | Performed by: INTERNAL MEDICINE

## 2017-07-07 RX ORDER — IPRATROPIUM/ALBUTEROL SULFATE 20-100 MCG
1 MIST INHALER (GRAM) INHALATION
Qty: 4 G | Refills: 5 | Status: SHIPPED | OUTPATIENT
Start: 2017-07-07

## 2017-07-07 RX ORDER — ALBUTEROL SULFATE 90 UG/1
2 AEROSOL, METERED RESPIRATORY (INHALATION) EVERY 6 HOURS PRN
Qty: 6.7 G | Refills: 3 | Status: SHIPPED | OUTPATIENT
Start: 2017-07-07

## 2017-07-07 NOTE — PROGRESS NOTES
"Subjective   Darcy Palomino is a 52 y.o. female.   Pt is here to follow up on HTN,pantobular emphysema,CKDIII, depression and anxiety.             History of Present Illness   Pt is here to follow up on HTN,pantobular emphysema,CKDIII, depression and anxiety.   Pt states her chronic issues are doing well. She recently had a flare up of her bronchitis. She was given a zpack which states helped but feels like her \"breathing is not back to baseline\". She states over the last 2 days she had 2 episodes of chest pain, which included pain located in the epigastric region and her left fingers tingling, she also has had these episodes when her anxiety is up and she states she has been using her Xanax when needed which will usually relieve  her symptoms. She states the chest pain will only last a second or two and go away. She denies N/V,jaw pain, or SOA worse than what she has had with panic attacks before. She has coughed more frequently as of late due to bronchitis and thinks this may be the issue.   She states she had a stress test several years ago which was negative.   UTI symptoms from prior visit have resolved after completion of abx course.                 The following portions of the patient's history were reviewed and updated as appropriate: allergies, current medications, past family history, past medical history, past social history, past surgical history and problem list.               Review of Systems   Constitutional: Negative.    HENT: Negative.    Eyes: Negative.    Respiratory:        See HPI.    Cardiovascular:        See HPI.    Gastrointestinal: Negative.    Endocrine: Negative.    Genitourinary: Negative.    Musculoskeletal: Negative.    Skin: Negative.    Allergic/Immunologic: Negative.    Neurological: Negative.    Hematological: Negative.    Psychiatric/Behavioral:        See HPI.               Objective   Physical Exam   Constitutional: She is oriented to person, place, and time. She appears " well-developed and well-nourished.   HENT:   Head: Normocephalic and atraumatic.   Right Ear: External ear normal.   Left Ear: External ear normal.   Nose: Nose normal.   Mouth/Throat: Oropharynx is clear and moist.   Eyes: Conjunctivae and EOM are normal. Pupils are equal, round, and reactive to light.   Neck: Normal range of motion. Neck supple.   Cardiovascular: Normal rate, regular rhythm, normal heart sounds and intact distal pulses.    Pulmonary/Chest: Effort normal.   Chronically decreased BS close to  baseline.  Expiratory wheezing noted and rhonchi noted on the right.   Pt is not coughing today.    Abdominal: Soft. Bowel sounds are normal.   Neurological: She is alert and oriented to person, place, and time. She has normal reflexes.   Skin: Skin is warm and dry.   Psychiatric: She has a normal mood and affect.   Nursing note and vitals reviewed.               Assessment/Plan     1.) Refer to Cardiology   2.) refill chronic meds   3.) Follow up in 2 weeks, will repeat CMP at that time.   4.) Prednisone taper as directed.( I discussed with patient that this is a low dose taper, with her CKD I needed to be extremely careful albeit her lungs definitely warranted a taper today)  5.) If chest pains returns, or worsens go straight to the ER.   6.) EKG done in office today NSR 82 bpm,NAD, anterolateral ST-T changes.

## 2017-07-10 ENCOUNTER — TELEPHONE (OUTPATIENT)
Dept: INTERNAL MEDICINE | Facility: CLINIC | Age: 52
End: 2017-07-10

## 2017-07-10 NOTE — TELEPHONE ENCOUNTER
----- Message from [place] sent at 7/10/2017  1:42 PM EDT -----  Pt called and stated that she has been having chest pains since this weekend. She wanted to know what to do. I informed pt that in the last office note with Dr. Low she was advised to go straight to ER if CP persist. Pt understood and is now going to ER. cw

## 2017-07-21 ENCOUNTER — APPOINTMENT (OUTPATIENT)
Dept: GENERAL RADIOLOGY | Facility: HOSPITAL | Age: 52
End: 2017-07-21

## 2017-07-21 ENCOUNTER — APPOINTMENT (OUTPATIENT)
Dept: CT IMAGING | Facility: HOSPITAL | Age: 52
End: 2017-07-21

## 2017-07-21 ENCOUNTER — HOSPITAL ENCOUNTER (INPATIENT)
Facility: HOSPITAL | Age: 52
LOS: 1 days | Discharge: LEFT BEFORE DISCHARGE | End: 2017-07-22
Attending: EMERGENCY MEDICINE | Admitting: INTERNAL MEDICINE

## 2017-07-21 DIAGNOSIS — N18.9 CKD (CHRONIC KIDNEY DISEASE), UNSPECIFIED STAGE: ICD-10-CM

## 2017-07-21 DIAGNOSIS — N39.0 URINARY TRACT INFECTION WITHOUT HEMATURIA, SITE UNSPECIFIED: ICD-10-CM

## 2017-07-21 DIAGNOSIS — R51.9 ACUTE INTRACTABLE HEADACHE, UNSPECIFIED HEADACHE TYPE: ICD-10-CM

## 2017-07-21 DIAGNOSIS — G81.94 LEFT HEMIPARESIS (HCC): Primary | ICD-10-CM

## 2017-07-21 DIAGNOSIS — I63.9 CEREBROVASCULAR ACCIDENT (CVA), UNSPECIFIED MECHANISM (HCC): ICD-10-CM

## 2017-07-21 LAB
ALT SERPL W P-5'-P-CCNC: 23 U/L (ref 7–40)
APTT PPP: 25.2 SECONDS (ref 24–31)
AST SERPL-CCNC: 20 U/L (ref 0–33)
BACTERIA UR QL AUTO: ABNORMAL /HPF
BASOPHILS # BLD AUTO: 0.02 10*3/MM3 (ref 0–0.2)
BASOPHILS NFR BLD AUTO: 0.2 % (ref 0–1)
BILIRUB UR QL STRIP: NEGATIVE
CLARITY UR: ABNORMAL
COLOR UR: YELLOW
DEPRECATED RDW RBC AUTO: 52.9 FL (ref 37–54)
EOSINOPHIL # BLD AUTO: 0.2 10*3/MM3 (ref 0–0.3)
EOSINOPHIL NFR BLD AUTO: 1.8 % (ref 0–3)
ERYTHROCYTE [DISTWIDTH] IN BLOOD BY AUTOMATED COUNT: 13.8 % (ref 11.3–14.5)
GLUCOSE BLDC GLUCOMTR-MCNC: 169 MG/DL (ref 70–130)
GLUCOSE UR STRIP-MCNC: NEGATIVE MG/DL
HCT VFR BLD AUTO: 44.2 % (ref 34.5–44)
HGB BLD-MCNC: 15.1 G/DL (ref 11.5–15.5)
HGB UR QL STRIP.AUTO: ABNORMAL
HOLD SPECIMEN: NORMAL
HOLD SPECIMEN: NORMAL
HYALINE CASTS UR QL AUTO: ABNORMAL /LPF
IMM GRANULOCYTES # BLD: 0.05 10*3/MM3 (ref 0–0.03)
IMM GRANULOCYTES NFR BLD: 0.5 % (ref 0–0.6)
KETONES UR QL STRIP: NEGATIVE
LEUKOCYTE ESTERASE UR QL STRIP.AUTO: ABNORMAL
LYMPHOCYTES # BLD AUTO: 2.31 10*3/MM3 (ref 0.6–4.8)
LYMPHOCYTES NFR BLD AUTO: 21.3 % (ref 24–44)
MCH RBC QN AUTO: 35.4 PG (ref 27–31)
MCHC RBC AUTO-ENTMCNC: 34.2 G/DL (ref 32–36)
MCV RBC AUTO: 103.8 FL (ref 80–99)
MONOCYTES # BLD AUTO: 0.68 10*3/MM3 (ref 0–1)
MONOCYTES NFR BLD AUTO: 6.3 % (ref 0–12)
NEUTROPHILS # BLD AUTO: 7.6 10*3/MM3 (ref 1.5–8.3)
NEUTROPHILS NFR BLD AUTO: 69.9 % (ref 41–71)
NITRITE UR QL STRIP: NEGATIVE
PH UR STRIP.AUTO: 6.5 [PH] (ref 5–8)
PLATELET # BLD AUTO: 214 10*3/MM3 (ref 150–450)
PMV BLD AUTO: 9.2 FL (ref 6–12)
PROT UR QL STRIP: ABNORMAL
RBC # BLD AUTO: 4.26 10*6/MM3 (ref 3.89–5.14)
RBC # UR: ABNORMAL /HPF
REF LAB TEST METHOD: ABNORMAL
SP GR UR STRIP: 1.01 (ref 1–1.03)
SQUAMOUS #/AREA URNS HPF: ABNORMAL /HPF
TROPONIN I SERPL-MCNC: 0.02 NG/ML (ref 0–0.07)
UROBILINOGEN UR QL STRIP: ABNORMAL
WBC NRBC COR # BLD: 10.86 10*3/MM3 (ref 3.5–10.8)
WBC UR QL AUTO: ABNORMAL /HPF
WHOLE BLOOD HOLD SPECIMEN: NORMAL
WHOLE BLOOD HOLD SPECIMEN: NORMAL

## 2017-07-21 PROCEDURE — 84450 TRANSFERASE (AST) (SGOT): CPT | Performed by: EMERGENCY MEDICINE

## 2017-07-21 PROCEDURE — 70450 CT HEAD/BRAIN W/O DYE: CPT

## 2017-07-21 PROCEDURE — 25010000002 CEFTRIAXONE PER 250 MG: Performed by: EMERGENCY MEDICINE

## 2017-07-21 PROCEDURE — 85730 THROMBOPLASTIN TIME PARTIAL: CPT | Performed by: EMERGENCY MEDICINE

## 2017-07-21 PROCEDURE — 84460 ALANINE AMINO (ALT) (SGPT): CPT | Performed by: EMERGENCY MEDICINE

## 2017-07-21 PROCEDURE — 25010000002 ONDANSETRON PER 1 MG

## 2017-07-21 PROCEDURE — 99285 EMERGENCY DEPT VISIT HI MDM: CPT

## 2017-07-21 PROCEDURE — 80047 BASIC METABLC PNL IONIZED CA: CPT

## 2017-07-21 PROCEDURE — 84484 ASSAY OF TROPONIN QUANT: CPT

## 2017-07-21 PROCEDURE — 25010000002 METOCLOPRAMIDE PER 10 MG: Performed by: EMERGENCY MEDICINE

## 2017-07-21 PROCEDURE — 0042T HC CT CEREBRAL PERFUSION W/WO CONTRAST: CPT

## 2017-07-21 PROCEDURE — 87077 CULTURE AEROBIC IDENTIFY: CPT | Performed by: EMERGENCY MEDICINE

## 2017-07-21 PROCEDURE — 99223 1ST HOSP IP/OBS HIGH 75: CPT | Performed by: INTERNAL MEDICINE

## 2017-07-21 PROCEDURE — 85014 HEMATOCRIT: CPT

## 2017-07-21 PROCEDURE — 71010 HC CHEST PA OR AP: CPT

## 2017-07-21 PROCEDURE — 87086 URINE CULTURE/COLONY COUNT: CPT | Performed by: EMERGENCY MEDICINE

## 2017-07-21 PROCEDURE — 25010000002 MORPHINE PER 10 MG: Performed by: EMERGENCY MEDICINE

## 2017-07-21 PROCEDURE — 81001 URINALYSIS AUTO W/SCOPE: CPT | Performed by: EMERGENCY MEDICINE

## 2017-07-21 PROCEDURE — 99223 1ST HOSP IP/OBS HIGH 75: CPT | Performed by: PSYCHIATRY & NEUROLOGY

## 2017-07-21 PROCEDURE — 87186 SC STD MICRODIL/AGAR DIL: CPT | Performed by: EMERGENCY MEDICINE

## 2017-07-21 PROCEDURE — 0 IOPAMIDOL PER 1 ML: Performed by: EMERGENCY MEDICINE

## 2017-07-21 PROCEDURE — 82962 GLUCOSE BLOOD TEST: CPT

## 2017-07-21 PROCEDURE — 25010000002 ALTEPLASE PER 1 MG: Performed by: EMERGENCY MEDICINE

## 2017-07-21 PROCEDURE — 85025 COMPLETE CBC W/AUTO DIFF WBC: CPT | Performed by: EMERGENCY MEDICINE

## 2017-07-21 PROCEDURE — 3E03317 INTRODUCTION OF OTHER THROMBOLYTIC INTO PERIPHERAL VEIN, PERCUTANEOUS APPROACH: ICD-10-PCS | Performed by: EMERGENCY MEDICINE

## 2017-07-21 PROCEDURE — 93005 ELECTROCARDIOGRAM TRACING: CPT | Performed by: EMERGENCY MEDICINE

## 2017-07-21 RX ORDER — GABAPENTIN 400 MG/1
400 CAPSULE ORAL EVERY 8 HOURS SCHEDULED
Status: DISCONTINUED | OUTPATIENT
Start: 2017-07-21 | End: 2017-07-22 | Stop reason: HOSPADM

## 2017-07-21 RX ORDER — CEFTRIAXONE SODIUM 1 G/50ML
1 INJECTION, SOLUTION INTRAVENOUS EVERY 24 HOURS
Status: DISCONTINUED | OUTPATIENT
Start: 2017-07-22 | End: 2017-07-22 | Stop reason: HOSPADM

## 2017-07-21 RX ORDER — SODIUM CHLORIDE 9 MG/ML
100 INJECTION, SOLUTION INTRAVENOUS ONCE
Status: COMPLETED | OUTPATIENT
Start: 2017-07-21 | End: 2017-07-21

## 2017-07-21 RX ORDER — SODIUM CHLORIDE 9 MG/ML
75 INJECTION, SOLUTION INTRAVENOUS CONTINUOUS
Status: DISCONTINUED | OUTPATIENT
Start: 2017-07-21 | End: 2017-07-22 | Stop reason: HOSPADM

## 2017-07-21 RX ORDER — ONDANSETRON 2 MG/ML
INJECTION INTRAMUSCULAR; INTRAVENOUS
Status: COMPLETED
Start: 2017-07-21 | End: 2017-07-21

## 2017-07-21 RX ORDER — ONDANSETRON 2 MG/ML
4 INJECTION INTRAMUSCULAR; INTRAVENOUS ONCE
Status: COMPLETED | OUTPATIENT
Start: 2017-07-21 | End: 2017-07-21

## 2017-07-21 RX ORDER — METOCLOPRAMIDE HYDROCHLORIDE 5 MG/ML
10 INJECTION INTRAMUSCULAR; INTRAVENOUS ONCE
Status: COMPLETED | OUTPATIENT
Start: 2017-07-21 | End: 2017-07-21

## 2017-07-21 RX ORDER — ALPRAZOLAM 1 MG/1
1 TABLET ORAL 3 TIMES DAILY PRN
Status: DISCONTINUED | OUTPATIENT
Start: 2017-07-21 | End: 2017-07-22 | Stop reason: HOSPADM

## 2017-07-21 RX ORDER — DULOXETIN HYDROCHLORIDE 60 MG/1
60 CAPSULE, DELAYED RELEASE ORAL 2 TIMES DAILY
Status: DISCONTINUED | OUTPATIENT
Start: 2017-07-21 | End: 2017-07-22 | Stop reason: HOSPADM

## 2017-07-21 RX ORDER — TIZANIDINE 4 MG/1
4 TABLET ORAL EVERY 12 HOURS SCHEDULED
Status: DISCONTINUED | OUTPATIENT
Start: 2017-07-21 | End: 2017-07-22 | Stop reason: HOSPADM

## 2017-07-21 RX ORDER — NICOTINE 21 MG/24HR
1 PATCH, TRANSDERMAL 24 HOURS TRANSDERMAL
Status: DISCONTINUED | OUTPATIENT
Start: 2017-07-21 | End: 2017-07-22 | Stop reason: HOSPADM

## 2017-07-21 RX ORDER — SODIUM CHLORIDE 0.9 % (FLUSH) 0.9 %
1-10 SYRINGE (ML) INJECTION AS NEEDED
Status: DISCONTINUED | OUTPATIENT
Start: 2017-07-21 | End: 2017-07-22 | Stop reason: HOSPADM

## 2017-07-21 RX ORDER — MORPHINE SULFATE 4 MG/ML
4 INJECTION, SOLUTION INTRAMUSCULAR; INTRAVENOUS ONCE
Status: COMPLETED | OUTPATIENT
Start: 2017-07-21 | End: 2017-07-21

## 2017-07-21 RX ORDER — OXYCODONE AND ACETAMINOPHEN 10; 325 MG/1; MG/1
1 TABLET ORAL EVERY 6 HOURS PRN
Status: DISCONTINUED | OUTPATIENT
Start: 2017-07-21 | End: 2017-07-22 | Stop reason: HOSPADM

## 2017-07-21 RX ORDER — ONDANSETRON 4 MG/1
4 TABLET, FILM COATED ORAL EVERY 8 HOURS PRN
Status: DISCONTINUED | OUTPATIENT
Start: 2017-07-21 | End: 2017-07-22 | Stop reason: HOSPADM

## 2017-07-21 RX ORDER — CLOPIDOGREL BISULFATE 75 MG/1
75 TABLET ORAL DAILY
Status: DISCONTINUED | OUTPATIENT
Start: 2017-07-22 | End: 2017-07-22 | Stop reason: HOSPADM

## 2017-07-21 RX ORDER — HYDROCHLOROTHIAZIDE 25 MG/1
25 TABLET ORAL DAILY
Status: DISCONTINUED | OUTPATIENT
Start: 2017-07-22 | End: 2017-07-22 | Stop reason: HOSPADM

## 2017-07-21 RX ORDER — ATORVASTATIN CALCIUM 40 MG/1
80 TABLET, FILM COATED ORAL NIGHTLY
Status: DISCONTINUED | OUTPATIENT
Start: 2017-07-21 | End: 2017-07-22 | Stop reason: HOSPADM

## 2017-07-21 RX ORDER — IPRATROPIUM BROMIDE AND ALBUTEROL SULFATE 2.5; .5 MG/3ML; MG/3ML
3 SOLUTION RESPIRATORY (INHALATION)
Status: DISCONTINUED | OUTPATIENT
Start: 2017-07-22 | End: 2017-07-22 | Stop reason: HOSPADM

## 2017-07-21 RX ORDER — SODIUM CHLORIDE 0.9 % (FLUSH) 0.9 %
10 SYRINGE (ML) INJECTION AS NEEDED
Status: DISCONTINUED | OUTPATIENT
Start: 2017-07-21 | End: 2017-07-22 | Stop reason: HOSPADM

## 2017-07-21 RX ORDER — CEFTRIAXONE SODIUM 1 G/50ML
1 INJECTION, SOLUTION INTRAVENOUS ONCE
Status: COMPLETED | OUTPATIENT
Start: 2017-07-21 | End: 2017-07-21

## 2017-07-21 RX ADMIN — GABAPENTIN 400 MG: 400 CAPSULE ORAL at 22:39

## 2017-07-21 RX ADMIN — ALTEPLASE 7.15 MG: KIT at 18:19

## 2017-07-21 RX ADMIN — TIZANIDINE 4 MG: 4 TABLET ORAL at 22:39

## 2017-07-21 RX ADMIN — MORPHINE SULFATE 4 MG: 4 INJECTION, SOLUTION INTRAMUSCULAR; INTRAVENOUS at 20:54

## 2017-07-21 RX ADMIN — ALTEPLASE 64.31 MG: KIT at 18:20

## 2017-07-21 RX ADMIN — OXYCODONE HYDROCHLORIDE AND ACETAMINOPHEN 1 TABLET: 10; 325 TABLET ORAL at 22:45

## 2017-07-21 RX ADMIN — METOCLOPRAMIDE 10 MG: 5 INJECTION, SOLUTION INTRAMUSCULAR; INTRAVENOUS at 19:10

## 2017-07-21 RX ADMIN — SODIUM CHLORIDE 100 ML/HR: 9 INJECTION, SOLUTION INTRAVENOUS at 19:12

## 2017-07-21 RX ADMIN — DULOXETINE HYDROCHLORIDE 60 MG: 60 CAPSULE, DELAYED RELEASE ORAL at 22:39

## 2017-07-21 RX ADMIN — NICOTINE 1 PATCH: 21 PATCH, EXTENDED RELEASE TRANSDERMAL at 21:15

## 2017-07-21 RX ADMIN — SODIUM CHLORIDE 1000 ML: 9 INJECTION, SOLUTION INTRAVENOUS at 19:09

## 2017-07-21 RX ADMIN — ONDANSETRON 4 MG: 2 INJECTION INTRAMUSCULAR; INTRAVENOUS at 18:27

## 2017-07-21 RX ADMIN — CEFTRIAXONE SODIUM 1 G: 1 INJECTION, SOLUTION INTRAVENOUS at 21:38

## 2017-07-21 RX ADMIN — IOPAMIDOL 40 ML: 755 INJECTION, SOLUTION INTRAVENOUS at 18:36

## 2017-07-21 RX ADMIN — SODIUM CHLORIDE 125 ML/HR: 9 INJECTION, SOLUTION INTRAVENOUS at 20:55

## 2017-07-21 RX ADMIN — ATORVASTATIN CALCIUM 80 MG: 40 TABLET, FILM COATED ORAL at 20:55

## 2017-07-21 NOTE — CONSULTS
"Subjective      CC: left hemiparesis    History of Present Illness   Darcy Palomino is a 52 y.o. female is seen today in consultation at the request of Dr. Us for possible stroke. At 4:45 pm she developed the acute onset of left hemiparesis and paresthesias. She reports that her symptoms have persisted unchanged since that time. An associated headache was reported initially, though not currently. She does not back pain, which has been a chronic issue and she is followed through a pain clinic. She denies other associated neurologic cymptoms.       The following portions of the patient's history were reviewed and updated as appropriate: allergies, current medications, past family history, past medical history, past social history, past surgical history and problem list.    PMH: COPD, Depression, chronic back pain  FH: stroke  SH: 1/2 ppd tobacco use, no EtOH    No ASA or other anti-platelet/anticoagulant usage at home    Review of Systems   Constitutional: Negative.    Respiratory: Negative.    Cardiovascular: Negative.    Genitourinary: Negative.    Musculoskeletal: Positive for back pain.   Psychiatric/Behavioral: Positive for dysphoric mood.   All other systems reviewed and are negative.      Objective   General appearance today is normal.   Peripheral pulses were present and symmetric.   The ophthalmoscopic exam today is unremarkable. This discs and posterior elements are unremarkable.    /75  Pulse 76  Temp 99.3 °F (37.4 °C) (Oral)   Resp 18  Ht 66\" (167.6 cm)  Wt 175 lb (79.4 kg)  SpO2 97%  BMI 28.25 kg/m2    Physical Exam   Constitutional: She is oriented to person, place, and time.   Neurological: She is oriented to person, place, and time. She has a normal Finger-Nose-Finger Test.   Reflex Scores:       Tricep reflexes are 2+ on the right side and 2+ on the left side.       Bicep reflexes are 2+ on the right side and 2+ on the left side.       Brachioradialis reflexes are 2+ on the right " side and 2+ on the left side.       Patellar reflexes are 2+ on the right side and 2+ on the left side.       Achilles reflexes are 2+ on the right side and 2+ on the left side.  Psychiatric: Her speech is normal.        Neurologic Exam     Mental Status   Oriented to person, place, and time.   Registration: recalls 3 of 3 objects. Recall at 5 minutes: recalls 3 of 3 objects. Follows 3 step commands.   Attention: normal. Concentration: normal.   Speech: speech is normal   Level of consciousness: alert  Knowledge: good.   Normal comprehension.     Cranial Nerves   Cranial nerves II through XII intact.        Except for subjectively decreased sensation on the left splitting the midline     Motor Exam   Muscle bulk: normal  Overall muscle tone: normal       Decreased strength on the left, though without drift;      Sensory Exam        Decreased light touch over left hemibody     Gait, Coordination, and Reflexes     Gait  Gait: (Gait not tested as patient is weak on left, in bed and receiving tPA)    Coordination   Finger to nose coordination: normal    Reflexes   Right brachioradialis: 2+  Left brachioradialis: 2+  Right biceps: 2+  Left biceps: 2+  Right triceps: 2+  Left triceps: 2+  Right patellar: 2+  Left patellar: 2+  Right achilles: 2+  Left achilles: 2+      Laboratory and radiological testing are notable for an unremarkable troponin, PTT, CBC. I don't appreciate abnormality on head CT or CTP (I reviewed images personally).       Assessment/Plan     Darcy Palomino is admitted with left hemiparesis. I agree with concern for stroke and think that tPA administration is reasonable. However, I am not certain that stroke will prove to be the underlying etiology. It is possible that her symptoms will not be explained. For now, I will request ECHO/Carotid Dopplers and an MRI. She will have a f/u head CT in 24 hours as part of our tPA protocol. This was all discussed with Ms Palomino.      As part of this visit I reviewed  prior lab results, reviewed radiology results, reviewed radiology images and discussed the history with Dr. Us . Please see above for details.

## 2017-07-21 NOTE — PAYOR COMM NOTE
"Darcy Palomino (52 y.o. Female) Initial notification and clinicals faxed.  Thanks, Tyler MCKEON    Date of Birth Social Security Number Address Home Phone MRN    1965  [address] [place], [place] [address]DANIELA [facility] [address] [phone] [id number]    Muslim Marital Status          None Single       Admission Date Admission Type Admitting Provider Attending Provider Department, Room/Bed    7/21/17 Emergency Alan Christopher MD [place], Presley Guajardo MD [facility] Emergency Department, 18/18    Discharge Date Discharge Disposition Discharge Destination                      Attending Provider: Presley Us MD     Allergies:  Nsaids, Toradol [Ketorolac Tromethamine]    Isolation:  None   Infection:  MRSA (05/29/17)   Code Status:  FULL    Ht:  66\" (167.6 cm)   Wt:  175 lb (79.4 kg)    Admission Cmt:  None   Principal Problem:  None                Active Insurance as of 7/21/2017     Primary Coverage     Payor Plan Insurance Group Employer/Plan Group    HUMANA MEDICAID [employer] [id number]     Payor Plan Address Payor Plan Phone Number Effective From Effective To    PO  [phone] 1/1/2014     [place] [address]       Subscriber Name Subscriber Birth Date Member ID       DARCY PALOMINO 1965 [id number]                 Emergency Contacts      (Rel.) Home Phone Work Phone Mobile Phone    Sapna Aleman (Sister) [phone] -- --            Insurance Information                HU[employer] MEDICAID/[employer]NA [employer] Phone: [phone]    Subscriber: Darcy Palomino Subscriber#: [id number]    Group#: [id number] Precert#:           Treatment Team     Provider Relationship Specialty Contact    Presley Us MD Attending --  [phone]    Chuy Pérez, MIKE Registered Nurse --      Tam Pal MD Consulting Physician Neurology  [phone]    Tamara Paniagua, RN Registered Nurse --  [phone]    [facility]            Problem List           " Codes Noted - Resolved       Hospital    Left hemiparesis ICD-10-CM: G81.94  ICD-9-CM: 342.90 7/21/2017 - Present       Non-Hospital    Atypical angina ICD-10-CM: I20.8  ICD-9-CM: 413.9 7/7/2017 - Present    Urinary tract infection without hematuria ICD-10-CM: N39.0  ICD-9-CM: 599.0 6/15/2017 - Present    Essential hypertension ICD-10-CM: I10  ICD-9-CM: 401.9 5/16/2017 - Present    Heart murmur ICD-10-CM: R01.1  ICD-9-CM: 785.2 5/16/2017 - Present    Hx of bladder cancer ICD-10-CM: Z85.51  ICD-9-CM: V10.51 4/14/2017 - Present    Elevated lipase ICD-10-CM: R74.8  ICD-9-CM: 790.5 2/17/2017 - Present    Chest pain ICD-10-CM: R07.9  ICD-9-CM: 786.50 2/17/2017 - Present    Tobacco abuse ICD-10-CM: Z72.0  ICD-9-CM: 305.1 2/17/2017 - Present    Chronic kidney disease, stage 3 ICD-10-CM: N18.3  ICD-9-CM: 585.3 2/17/2017 - Present    Presence of urostomy ICD-10-CM: Z93.6  ICD-9-CM: V44.6 2/9/2017 - Present    Chronic midline low back pain without sciatica ICD-10-CM: M54.5, G89.29  ICD-9-CM: 724.2, 338.29 1/4/2017 - Present    Anxiety ICD-10-CM: F41.9  ICD-9-CM: 300.00 10/11/2016 - Present    Hot flashes ICD-10-CM: R23.2  ICD-9-CM: 782.62 8/29/2016 - Present    Nerve pain ICD-10-CM: M79.2  ICD-9-CM: 729.2 8/29/2016 - Present    History of cervical cancer ICD-10-CM: Z85.41  ICD-9-CM: V10.41 5/24/2016 - Present    Panlobular emphysema ICD-10-CM: J43.1  ICD-9-CM: 492.8 5/24/2016 - Present    Depression ICD-10-CM: F32.9  ICD-9-CM: 311 5/24/2016 - Present          History & Physical     No notes of this type exist for this encounter.        Emergency Department Notes     No notes of this type exist for this encounter.        Vital Signs (last 24 hours)       07/20 0700  -  07/21 0659 07/21 0700  -  07/21 1953   Most Recent    Temp (°F)   98.3 -  99.3     99.3 (37.4)    Heart Rate   73 -  91     76    Resp   17 -  20     18    BP   117/66 -  167/80     117/74    SpO2 (%)   95 -  97     95          Hospital Medications (active)        Dose Frequency Start End    alteplase (ACTIVASE) 100 mg kit 0.81 mg/kg × 79.4 kg Once 7/21/2017 7/21/2017    Sig - Route: Infuse 64.31 mL into a venous catheter 1 (One) Time. - Intravenous    Cosign for Ordering: Required by Presley Us MD    alteplase (ACTIVASE) bolus from vial 0.09 mg/kg × 79.4 kg Once 7/21/2017 7/21/2017    Sig - Route: Infuse 7.15 mL into a venous catheter 1 (One) Time. - Intravenous    Cosign for Ordering: Required by Presley Us MD    atorvastatin (LIPITOR) tablet 80 mg 80 mg Nightly 7/21/2017     Sig - Route: Take 2 tablets by mouth Every Night. - Oral    cefTRIAXone (ROCEPHIN) IVPB 1 g 1 g Once 7/21/2017     Sig - Route: Infuse 50 mL into a venous catheter 1 (One) Time. - Intravenous    clopidogrel (PLAVIX) tablet 75 mg 75 mg Daily 7/22/2017     Sig - Route: Take 1 tablet by mouth Daily. - Oral    iopamidol (ISOVUE-370) 76 % injection 50 mL 50 mL Once in Imaging 7/21/2017 7/21/2017    Sig - Route: Infuse 50 mL into a venous catheter Once. - Intravenous    metoclopramide (REGLAN) injection 10 mg 10 mg Once 7/21/2017 7/21/2017    Sig - Route: Infuse 2 mL into a venous catheter 1 (One) Time. - Intravenous    Morphine sulfate (PF) injection 4 mg 4 mg Once 7/21/2017     Sig - Route: Infuse 1 mL into a venous catheter 1 (One) Time. - Intravenous    ondansetron (ZOFRAN) injection 4 mg 4 mg Once 7/21/2017 7/21/2017    Sig - Route: Infuse 2 mL into a venous catheter 1 (One) Time. - Intravenous    Cosign for Ordering: Required by Presley Us MD    sodium chloride 0.9 % bolus 1,000 mL 1,000 mL Once 7/21/2017 7/21/2017    Sig - Route: Infuse 1,000 mL into a venous catheter 1 (One) Time. - Intravenous    sodium chloride 0.9 % flush 1-10 mL 1-10 mL As Needed 7/21/2017     Sig - Route: Infuse 1-10 mL into a venous catheter As Needed for Line Care. - Intravenous    sodium chloride 0.9 % flush 10 mL 10 mL As Needed 7/21/2017     Sig - Route: Infuse 10 mL into a venous catheter As  Needed for Line Care. - Intravenous    Cosign for Ordering: Required by Presley Us MD    sodium chloride 0.9 % infusion 100 mL/hr Once 7/21/2017 7/21/2017    Sig - Route: Infuse 100 mL/hr into a venous catheter 1 (One) Time. - Intravenous    Cosign for Ordering: Required by Presley Us MD    sodium chloride 0.9 % infusion 125 mL/hr Continuous 7/21/2017     Sig - Route: Infuse 125 mL/hr into a venous catheter Continuous. - Intravenous            Lab Results (last 24 hours)     Procedure Component Value Units Date/Time    Lavender Top [[id number]] Collected:  07/21/17 1812    Specimen:  Blood Updated:  07/21/17 1818    Light Blue Top [[id number]] Collected:  07/21/17 1812    Specimen:  Blood Updated:  07/21/17 1819    Green Top (Gel) [[id number]] Collected:  07/21/17 1812    Specimen:  Blood Updated:  07/21/17 1819    Gold Top - SST [[id number]] Collected:  07/21/17 1812    Specimen:  Blood Updated:  07/21/17 1819    POC Troponin, Rapid [[id number]]  (Normal) Collected:  07/21/17 1816    Specimen:  Blood Updated:  07/21/17 1830     Troponin I 0.02 ng/mL       Serial Number: [id number]:  [id number]       CBC Auto Differential [[id number]]  (Abnormal) Collected:  07/21/17 1812    Specimen:  Blood Updated:  07/21/17 1832     WBC 10.86 (H) 10*3/mm3      RBC 4.26 10*6/mm3      Hemoglobin 15.1 g/dL      Hematocrit 44.2 (H) %      .8 (H) fL      MCH 35.4 (H) pg      MCHC 34.2 g/dL      RDW 13.8 %      RDW-SD 52.9 fl      MPV 9.2 fL      Platelets 214 10*3/mm3      Neutrophil % 69.9 %      Lymphocyte % 21.3 (L) %      Monocyte % 6.3 %      Eosinophil % 1.8 %      Basophil % 0.2 %      Immature Grans % 0.5 %      Neutrophils, Absolute 7.60 10*3/mm3      Lymphocytes, Absolute 2.31 10*3/mm3      Monocytes, Absolute 0.68 10*3/mm3      Eosinophils, Absolute 0.20 10*3/mm3      Basophils, Absolute 0.02 10*3/mm3      Immature Grans, Absolute 0.05 (H) 10*3/mm3     CBC & Differential [[id number]] Collected:   07/21/17 1812    Specimen:  Blood Updated:  07/21/17 1832    Narrative:       The following orders were created for panel order CBC & Differential.  Procedure                               Abnormality         Status                     ---------                               -----------         ------                     CBC Auto Differential[[id number]]        Abnormal            Final result                 Please view results for these tests on the individual orders.    aPTT [[id number]]  (Normal) Collected:  07/21/17 1812    Specimen:  Blood Updated:  07/21/17 1845     PTT 25.2 seconds     Narrative:       PTT = The equivalent PTT values for the therapeutic range of heparin levels at 0.3 to 0.5 U/ml are 45 to 60 seconds.    Urine Culture [[id number]] Collected:  07/21/17 1857    Specimen:  Urine from Urine, Clean Catch Updated:  07/21/17 1906    Urinalysis With / Culture If Indicated [[id number]]  (Abnormal) Collected:  07/21/17 1857    Specimen:  Urine from Urine, Clean Catch Updated:  07/21/17 1907     Color, UA Yellow     Appearance, UA Cloudy (A)     pH, UA 6.5     Specific Gravity, UA 1.011     Glucose, UA Negative     Ketones, UA Negative     Bilirubin, UA Negative     Blood, UA Moderate (2+) (A)     Protein, UA Trace (A)     Leuk Esterase, UA Large (3+) (A)     Nitrite, UA Negative     Urobilinogen, UA 1.0 E.U./dL    Urinalysis, Microscopic Only [[id number]]  (Abnormal) Collected:  07/21/17 1857    Specimen:  Urine from Urine, Clean Catch Updated:  07/21/17 1907     RBC, UA 0-2 /HPF      WBC, UA Too Numerous to Count (A) /HPF      Bacteria, UA 1+ (A) /HPF      Squamous Epithelial Cells, UA 0-2 /HPF      Hyaline Casts, UA 0-6 /LPF      Methodology Automated Microscopy    AST [[id number]]  (Normal) Collected:  07/21/17 1812    Specimen:  Blood Updated:  07/21/17 1913     AST (SGOT) 20 U/L     ALT [[id number]]  (Normal) Collected:  07/21/17 1812    Specimen:  Blood Updated:  07/21/17 1913     ALT (SGPT) 23 U/L      [place] Draw [[id number]] Collected:  07/21/17 1812    Specimen:  Blood Updated:  07/21/17 1937    Narrative:       The following orders were created for panel order [place] Draw.  Procedure                               Abnormality         Status                     ---------                               -----------         ------                     Light Blue Top[[id number]]                                   In process                 Green Top (Gel)[[id number]]                                  In process                 Lavender Top[[id number]]                                     In process                 Gold Top - SST[[id number]]                                   In process                 Green Top (No Gel)[[id number]]                                                            Please view results for these tests on the individual orders.        Imaging Results (last 24 hours)     Procedure Component Value Units Date/Time    CT Head Without Contrast Stroke Protocol [[id number]] Collected:  07/21/17 1854     Updated:  07/21/17 1854    Narrative:       EXAMINATION: CT HEAD WO CONTRAST - 07/21/2017     INDICATION: Code 19, left-sided weakness, headache. Stroke protocol.     TECHNIQUE: 5 mm unenhanced images through the brain.     The radiation dose reduction device was turned on for each scan per the  ALARA (As Low as Reasonably Achievable) protocol.     COMPARISON: None.     FINDINGS: The calvarium appears intact. Included paranasal sinuses and  mastoid air cells appear clear. Soft tissue window images show the brain  to appear within normal limits. There is no evidence of hemorrhage,  contusion, edema, mass or mass effect, acute or old infarct,  hydrocephalus, or abnormal extra-axial collection. There is very mild  generalized cerebral atrophy within expected limits for patient's age.       Impression:       Brain appears within normal limits for age.     NOTE: Exam time is shown as 1800. Study was reviewed on the  CT scan  monitor and discussed with Dr. Us at 1805.     DICTATED:     07/21/2017  EDITED:         07/21/2017          CT Cerebral Perfusion With & Without Contrast [[id number]] Collected:  07/21/17 1911     Updated:  07/21/17 1911    Narrative:       EXAMINATION: CT CEREBRAL PERFUSION W/WO CONTRAST - 07/21/2017     INDICATION: Left-sided weakness.      TECHNIQUE: Cerebral perfusion analysis was performed using computed  tomography with contrast administration including postprocessing of  parametric maps with determination of cerebral blood flow, cerebral  blood volume, and mean transit time.     The radiation dose reduction device was turned on for each scan per the  ALARA (As Low as Reasonably Achievable) protocol.     COMPARISON: Unenhanced head CT scan of same day.      FINDINGS: Patient history indicates left-sided weakness. Mean transit  time and time to drain images show no consistent abnormality. Cerebral  blood flow images appear normal. Cerebral blood volume images appear  normal.       Impression:       Negative perfusion scan.     DICTATED:     07/21/2017  EDITED:         07/21/2017       XR Chest 1 View [[id number]] Updated:  07/21/17 1946        ECG/EMG Results (last 24 hours)     Procedure Component Value Units Date/Time    ECG 12 Lead [[id number]] Collected:  07/21/17 1842     Updated:  07/21/17 1842          Orders (last 24 hrs)     Start     Ordered    07/22/17 1917  clopidogrel (PLAVIX) tablet 75 mg  Daily      07/21/17 1916 07/22/17 1900  CT Head Without Contrast  1 Time Imaging     Comments:  Repeat 24 hours after tPA completed    07/21/17 1916 07/22/17 0600  Hemoglobin A1c  Morning Draw      07/21/17 1916 07/22/17 0600  Lipid Panel  Morning Draw      07/21/17 1916 07/22/17 0000  POC Glucose Fingerstick  Every 6 Hours     Comments:  May Discontinue After 2 Consecutive Readings Less Than 140  Notify Provider if 2 Readings Greater Than 140    07/21/17 1916 07/21/17 2100   atorvastatin (LIPITOR) tablet 80 mg  Nightly      07/21/17 1916 07/21/17 2000  Vital Signs (specify frequency)  Every Hour     Comments:  Keep SBP<185, DBP<110    07/21/17 1916 07/21/17 2000  Intake and Output  Every 4 Hours      07/21/17 1916 07/21/17 1934  ICU / CCU Bed Request  Once      07/21/17 1933    07/21/17 1933  Critical Care  Once     Comments:  This order was created via procedure documentation    07/21/17 1932 07/21/17 1930  cefTRIAXone (ROCEPHIN) IVPB 1 g  Once      07/21/17 1928 07/21/17 1917  Remove & Replace Compression Stockings (TEDS) Daily  Daily      07/21/17 1916 07/21/17 1916  Bilateral Carotid Duplex  Once      07/21/17 1916 07/21/17 1915  MRI Brain Without Contrast  1 Time Imaging      07/21/17 1916 07/21/17 1913  VTE Risk Assessment - Moderate Risk  Once      07/21/17 1916 07/21/17 1913  Place Sequential Compression Device  Once      07/21/17 1916 07/21/17 1913  Maintain Sequential Compression Device  Continuous      07/21/17 1916 07/21/17 1913  Transthoracic Echo Complete With Bubble Study  Once      07/21/17 1916 07/21/17 1912  Inpatient Admission  Once      07/21/17 1916 07/21/17 1912  Full Code  Continuous      07/21/17 1916 07/21/17 1911  Assessed for Rehabilitation Services  Once      07/21/17 1916 07/21/17 1911  repeat NIHSS immediately after tPA in completed  Once      07/21/17 1916 07/21/17 1911  AVOID indwelling urethral catheterization during IV thrombolytic infusion and for at least 24 hours after infusion  Until Discontinued      07/21/17 1916 07/21/17 1911  Bleeding precautions  Continuous      07/21/17 1916 07/21/17 1911  Post TPA precautions  Continuous      07/21/17 1916 07/21/17 1911  Pharmacologic VTE Prophylaxis Not Indicated: Other; Bleeding Risk (Risk for Intracerebral Hemorrhage)  Once      07/21/17 1916 07/21/17 1911  Vital Signs Per hospital policy post TPA administration  Per Hospital Policy     Comments:   Post tPA Administration.  [id number], [id number], W6Cw26D, then Per Unit Routine, Keep SBP <185, DPB <110    07/21/17 1916 07/21/17 1911  Pulse Oximetry, Continuous  Continuous     Comments:  Goal:  Keep O2 Sat greater than 94%    07/21/17 1916 07/21/17 1911  Cardiac Monitoring  Continuous      07/21/17 1916 07/21/17 1911  Head of bed  Until Discontinued      07/21/17 1916 07/21/17 1911  Activity - Strict Bed Rest  Until Discontinued      07/21/17 1916 07/21/17 1911  Turn Patient  Now Then Every 2 Hours      07/21/17 1916 07/21/17 1911  Neuro Checks  Per Hospital Policy      07/21/17 1916 07/21/17 1911  NIHSS Assessment  Every Shift     Comments:  Turn off all sedation medications prior to performing assessment. Assessment to be performed upon admission, transfer to another unit, discharge, and with neurological decline. If NIHSS change is greater than or equal to 4 and/or neurological decline is noted notify physician.    07/21/17 1916 07/21/17 1911  Place Compression Stockings (TEDs)  Once      07/21/17 1916 07/21/17 1911  Provide Stroke Education Material  Prior to Discharge     Comments:  Daily and prior to discharge    07/21/17 1916 07/21/17 1911  Tobacco Cessation Education  Once      07/21/17 1916 07/21/17 1911  Nursing Dysphagia Screening  Once      07/21/17 1916 07/21/17 1911  RN to Place Order SLP Consult - Eval & Treat Choosing Reason of RN Dysphagia Screen Failed  Continuous     Comments:  RN to Place Order SLP Consult - Eval & Treat Choosing Reason of RN Dysphagia Screen Failed    07/21/17 1916 07/21/17 1911  Nurse to Order Cardiac or Consistent Carbohydrate Diet if Patient Passes Dysphagia Screen  Once      07/21/17 1916 07/21/17 1911  Notify Provider  Until Discontinued      07/21/17 1916 07/21/17 1911  NPO Diet  Diet Effective Now     Comments:  Strict NPO Until Nursing Dysphagia Screen Complete    07/21/17 1916 07/21/17 1911  OT Consult: Eval & Treat for  stroke  Once      07/21/17 1916 07/21/17 1911  PT Consult: Eval & Treat  Once      07/21/17 1916    07/21/17 1911  SLP Consult: Eval & Treat  Once     Comments:  Per stroke protocol.    07/21/17 1916 07/21/17 1911  Consult to Case Management   Once     Provider:  (Not yet assigned)    07/21/17 1916 07/21/17 1911  Consult to Diabetes Educator  Once     Provider:  (Not yet assigned)    07/21/17 1916 07/21/17 1911  DO NOT STICK PATIENT <24 HOURS POST ALTEPLASE ADMINISTRATION  Until Discontinued      07/21/17 1916 07/21/17 1911  Insert Peripheral IV  Once      07/21/17 1916 07/21/17 1911  Saline Lock & Maintain IV Access  Continuous      07/21/17 1916 07/21/17 1910  sodium chloride 0.9 % flush 1-10 mL  As Needed      07/21/17 1916 07/21/17 1907  Urinalysis, Microscopic Only  Once      07/21/17 1906    07/21/17 1907  Urine Culture  Once      07/21/17 1906    07/21/17 1842  iopamidol (ISOVUE-370) 76 % injection 50 mL  Once in Imaging      07/21/17 1840    07/21/17 1831  POC Troponin, Rapid  Once      07/21/17 1830    07/21/17 1830  sodium chloride 0.9 % bolus 1,000 mL  Once      07/21/17 1828    07/21/17 1830  sodium chloride 0.9 % infusion  Continuous      07/21/17 1828    07/21/17 1829  ondansetron (ZOFRAN) injection 4 mg  Once      07/21/17 1827    07/21/17 1828  CT Cerebral Perfusion With & Without Contrast  1 Time Imaging      07/21/17 1828    07/21/17 1820  alteplase (ACTIVASE) bolus from vial  Once      07/21/17 1818    07/21/17 1820  alteplase (ACTIVASE) 100 mg kit  Once      07/21/17 1818    07/21/17 1820  sodium chloride 0.9 % infusion  Once      07/21/17 1818    07/21/17 1820  Morphine sulfate (PF) injection 4 mg  Once      07/21/17 1818    07/21/17 1820  metoclopramide (REGLAN) injection 10 mg  Once      07/21/17 1818 07/21/17 1820  Urinalysis With / Culture If Indicated  Once      07/21/17 1819 07/21/17 1818  Follow department guidlines to notify pharmacy that tPA  will be administered.  Once      07/21/17 1818    07/21/17 1818  Ensure an NIH Stroke Scale has been performed prior to tPA administration.  Once      07/21/17 1818    07/21/17 1818  Neuro Checks per Post-tPA Flowsheet  Per Hospital Policy     Comments:  Post-tPA Neuro Checks as follows:  Q 15 mins x 2 hrs from start of infusion, then Q 30 mins x 6 hrs, then Q 1 hr x 16 hrs.    07/21/17 1818 07/21/17 1818  Vital Signs per Post-tPA Flowsheet  Per Hospital Policy     Comments:  Post-tPA Vital Signs as follows:  Q 15 mins x 2 hrs from start of infusion, then Q 30 mins x 6 hrs, then Q 1 hr x 16 hrs.    07/21/17 1818 07/21/17 1818  Maintain blood pressure as follows:  Until Discontinued     Comments:  Systolic Blood Pressure less than 180 mmHg.  Diastolic Blood Pressure less than 105 mmHg.  Notify physician if unable to maintain as indicated with existing orders.    07/21/17 1818 07/21/17 1818  No arterial punctures, IM injections, or invasive procedures (F/C, N/G) for 24 hrs.  Continuous      07/21/17 1818 07/21/17 1818  No anticoagulant/antiplatelet agents for 24 hours.  Continuous      07/21/17 1818 07/21/17 1818  FOR ANY ACUTE NEUROLOGICAL CHANGE AT ANY TIME during or following tPA administration (new/worsening headache, acute hypertension, nausea and/or vomiting) suspect intracranial hemorrhage and notify stroke MD STAT.  Stop tPA if still infusing and document amount to be wasted.  Prepare for a stat non-contrast CT of head and refer to facility process for management of intracranial hemorrhage.  Until Discontinued      07/21/17 1818 07/21/17 1808  Initiate Code 19  Once      07/21/17 1808    07/21/17 1808  Consult Interventional Neurologist and/or Stroke Team  Once     Specialty:  Neurology  Provider:  (Not yet assigned)    07/21/17 1808    07/21/17 1808  Perform NIH Stroke Scale  Once     Comments:  Repeat as needed and per protocol    07/21/17 1808 07/21/17 1808  Measure Weight  Once       07/21/17 1808    07/21/17 1808  Head of bed 30 degrees or less  Until Discontinued      07/21/17 1808    07/21/17 1808  Undress and Gown  Once      07/21/17 1808    07/21/17 1808  Cardiac monitoring  Per Hospital Policy      07/21/17 1808    07/21/17 1808  Continuous Pulse Oximetry  Per Hospital Policy,   Status:  Canceled      07/21/17 1808    07/21/17 1808  Oxygen Therapy- Nasal Cannula; 2 LPM; Titrate for SPO2: equal to or greater than, 94%  Continuous      07/21/17 1808    07/21/17 1808  Vital signs  Per Hospital Policy     Comments:  Every 30 minutes - increase frequency as clinically indicated or for tPA administration.    07/21/17 1808    07/21/17 1808  Neuro checks  Every 30 Minutes     Comments:  Increase frequency as clinically indicated or for tPA administration.    07/21/17 1808    07/21/17 1808  Notify MD for SBP < 80 or > 180  Until Discontinued      07/21/17 1808    07/21/17 1808  No Hypotonic Fluids  Until Discontinued      07/21/17 1808    07/21/17 1808  NPO Diet  Diet Effective Now,   Status:  Canceled      07/21/17 1808    07/21/17 1808  Nursing swallow assessment  Once      07/21/17 1808    07/21/17 1808  XR Chest 1 View  1 Time Imaging     Comments:  Do not delay CT to obtain.    07/21/17 1808    07/21/17 1808  ECG 12 Lead  Once     Comments:  Do not delay CT to obtain    07/21/17 1808    07/21/17 1808  Insert large-bore peripheral IV - Right AC preferred  Once      07/21/17 1808    07/21/17 1808  [place] Draw  Once      07/21/17 1808    07/21/17 1808  CBC & Differential  Once      07/21/17 1808    07/21/17 1808  POCT CHEM 8  Once      07/21/17 1808    07/21/17 1808  POCT Troponin, Rapid  Once      07/21/17 1808    07/21/17 1808  POCT Protime/INR  Once      07/21/17 1808    07/21/17 1808  aPTT  Once      07/21/17 1808    07/21/17 1808  AST  Once      07/21/17 1808    07/21/17 1808  ALT  Once      07/21/17 1808 07/21/17 1808  Light Blue Top  PROCEDURE ONCE      07/21/17 1808 07/21/17 1808   Green Top (Gel)  PROCEDURE ONCE      07/21/17 1808    07/21/17 1808  Lavender Top  PROCEDURE ONCE      07/21/17 1808    07/21/17 1808  Gold Top - SST  PROCEDURE ONCE      07/21/17 1808    07/21/17 1808  Green Top (No Gel)  PROCEDURE ONCE,   Status:  Canceled      07/21/17 1808    07/21/17 1808  CBC Auto Differential  PROCEDURE ONCE      07/21/17 1808    07/21/17 1807  sodium chloride 0.9 % flush 10 mL  As Needed      07/21/17 1808    07/21/17 1805  CT Head Without Contrast Stroke Protocol  1 Time Imaging      07/21/17 1804    Unscheduled  Order CT Head Without Contrast for Neurological Decline  As Needed      07/21/17 1916          Physician Progress Notes (last 24 hours) (Notes from 7/20/2017  7:54 PM through 7/21/2017  7:54 PM)     No notes of this type exist for this encounter.           Consult Notes (last 24 hours) (Notes from 7/20/2017  7:54 PM through 7/21/2017  7:54 PM)      Tam Pal MD at 7/21/2017  7:00 PM  Version 1 of 1         Subjective      CC: left hemiparesis    History of Present Illness   Darcy Palomino is a 52 y.o. female is seen today in consultation at the request of Dr. Us for possible stroke. At 4:45 pm she developed the acute onset of left hemiparesis and paresthesias. She reports that her symptoms have persisted unchanged since that time. An associated headache was reported initially, though not currently. She does not back pain, which has been a chronic issue and she is followed through a pain clinic. She denies other associated neurologic cymptoms.       The following portions of the patient's history were reviewed and updated as appropriate: allergies, current medications, past family history, past medical history, past social history, past surgical history and problem list.    PMH: COPD, Depression, chronic back pain  FH: stroke  SH: 1/2 ppd tobacco use, no EtOH    No ASA or other anti-platelet/anticoagulant usage at home    Review of Systems   Constitutional: Negative.   "  Respiratory: Negative.    Cardiovascular: Negative.    Genitourinary: Negative.    Musculoskeletal: Positive for back pain.   Psychiatric/Behavioral: Positive for dysphoric mood.   All other systems reviewed and are negative.      Objective   General appearance today is normal.   Peripheral pulses were present and symmetric.   The ophthalmoscopic exam today is unremarkable. This discs and posterior elements are unremarkable.    /75  Pulse 76  Temp 99.3 °F (37.4 °C) (Oral)   Resp 18  Ht 66\" (167.6 cm)  Wt 175 lb (79.4 kg)  SpO2 97%  BMI 28.25 kg/m2    Physical Exam   Constitutional: She is oriented to person, place, and time.   Neurological: She is oriented to person, place, and time. She has a normal Finger-Nose-Finger Test.   Reflex Scores:       Tricep reflexes are 2+ on the right side and 2+ on the left side.       Bicep reflexes are 2+ on the right side and 2+ on the left side.       Brachioradialis reflexes are 2+ on the right side and 2+ on the left side.       Patellar reflexes are 2+ on the right side and 2+ on the left side.       Achilles reflexes are 2+ on the right side and 2+ on the left side.  Psychiatric: Her speech is normal.        Neurologic Exam     Mental Status   Oriented to person, place, and time.   Registration: recalls 3 of 3 objects. Recall at 5 minutes: recalls 3 of 3 objects. Follows 3 step commands.   Attention: normal. Concentration: normal.   Speech: speech is normal   Level of consciousness: alert  Knowledge: good.   Normal comprehension.     Cranial Nerves   Cranial nerves II through XII intact.        Except for subjectively decreased sensation on the left splitting the midline     Motor Exam   Muscle bulk: normal  Overall muscle tone: normal       Decreased strength on the left, though without drift;      Sensory Exam        Decreased light touch over left hemibody     Gait, Coordination, and Reflexes     Gait  Gait: (Gait not tested as patient is weak on left, in " bed and receiving tPA)    Coordination   Finger to nose coordination: normal    Reflexes   Right brachioradialis: 2+  Left brachioradialis: 2+  Right biceps: 2+  Left biceps: 2+  Right triceps: 2+  Left triceps: 2+  Right patellar: 2+  Left patellar: 2+  Right achilles: 2+  Left achilles: 2+      Laboratory and radiological testing are notable for an unremarkable troponin, PTT, CBC. I don't appreciate abnormality on head CT or CTP (I reviewed images personally).       Assessment/Plan     Darcy Palomino is admitted with left hemiparesis. I agree with concern for stroke and think that tPA administration is reasonable. However, I am not certain that stroke will prove to be the underlying etiology. It is possible that her symptoms will not be explained. For now, I will request ECHO/Carotid Dopplers and an MRI. She will have a f/u head CT in 24 hours as part of our tPA protocol. This was all discussed with Ms Palomino.      As part of this visit I reviewed prior lab results, reviewed radiology results, reviewed radiology images and discussed the history with Dr. Us . Please see above for details.     Electronically signed by Tam Pal MD at 7/21/2017  7:16 PM        Physical Therapy Notes (last 24 hours) (Notes from 7/20/2017  7:54 PM through 7/21/2017  7:54 PM)     No notes of this type exist for this encounter.

## 2017-07-21 NOTE — ED PROVIDER NOTES
Subjective   HPI Comments: Darcy Palomino is a 52 y.o. female who presents to the ED with neurological symptoms. She was last known well at 1645 today. She presented to a [facility] for sudden onset of left sided facial and extremity weakness and numbness, as well as a headache. She also notes additional nausea. She has no history of migraines and is not currently on blood thinners. Before onset, she reports that she felt normal and was having a normal day. She has an extensive history that included HTN, HLD, CKD stage 3, bladder cancer, COPD, and emphysema.     Patient is a 52 y.o. female presenting with neurologic complaint.   History provided by:  Patient  Neurologic Problem   The patient's primary symptoms include focal weakness (left sided) and weakness (Left sided). This is a new problem. The current episode started today. The neurological problem developed suddenly. The last time the patient was known to be well was 7/21/2017 4:45 PM.  The problem has been gradually improving since onset. There was left-sided focality noted. Associated symptoms include headaches and nausea. Past treatments include nothing.       Review of Systems   Gastrointestinal: Positive for nausea.   Neurological: Positive for focal weakness (left sided), weakness (Left sided) and headaches.       Past Medical History:   Diagnosis Date   • Anxiety    • Asthma    • Cervical cancer    • Chronic bronchitis    • COPD (chronic obstructive pulmonary disease)    • Depression    • Kidney disease        Allergies   Allergen Reactions   • Nsaids    • Toradol [Ketorolac Tromethamine] Nausea Only       Past Surgical History:   Procedure Laterality Date   • CYSTECTOMY     • HYSTERECTOMY         Family History   Problem Relation Age of Onset   • Colon cancer Mother    • Stroke Father    • Stroke Brother    • Heart attack Maternal Uncle    • Bone cancer Maternal Grandmother        Social History     Social History   • Marital status: Single     Spouse name: N/A    • Number of children: N/A   • Years of education: N/A     Social History Main Topics   • Smoking status: Current Every Day Smoker   • Smokeless tobacco: Never Used   • Alcohol use No   • Drug use: No   • Sexual activity: Defer     Other Topics Concern   • None     Social History Narrative         Objective   Physical Exam   Constitutional: She is oriented to person, place, and time. She appears well-developed and well-nourished. No distress.   HENT:   Head: Normocephalic and atraumatic.   Eyes: Pupils are equal, round, and reactive to light. Right eye exhibits nystagmus. Left eye exhibits nystagmus.   Nystagmus to the left.   Neck: Normal range of motion.   Cardiovascular: Normal rate, regular rhythm, normal heart sounds and intact distal pulses.    No murmur heard.  Pulmonary/Chest: Effort normal and breath sounds normal. No respiratory distress.   Musculoskeletal: Normal range of motion.   Neurological: She is alert and oriented to person, place, and time. A sensory deficit (Mild left sided ) is present.   Mild left sided facial droop. Mild drift in the left arm and leg.    Skin: Skin is warm and dry. She is not diaphoretic.   Psychiatric: She has a normal mood and affect. Her behavior is normal.   Nursing note and vitals reviewed.      Critical Care  Performed by: JASBIR SALCEDO  Authorized by: JASBIR SALCEDO   Total critical care time: 45 minutes  Critical care time was exclusive of separately billable procedures and treating other patients.  Critical care was necessary to treat or prevent imminent or life-threatening deterioration of the following conditions: CNS failure or compromise.  Critical care was time spent personally by me on the following activities: discussions with consultants, evaluation of patient's response to treatment, obtaining history from patient or surrogate, ordering and review of laboratory studies, review of old charts, examination of patient, ordering and performing  treatments and interventions, ordering and review of radiographic studies and re-evaluation of patient's condition.               ED Course  ED Course   Value Comment By Time    I spoke with Dr. Pal who recommended tPA. -XIN Vita Fay 07/21 1809    The patient has an NIH stroke scale of 4.  She falls within the window for TPA.  I discussed the presentation with risks and benefits with the patient.  We discussed the benefits including the potential for improved function as well as full recovery.  We also discussed the potential risks of a bleeding complication, death, and residual symptoms.  The patient's symptoms may be unrelated to a stroke, but at this point the patient has the deficit and within the window and thus we all agree that she is best benefited from administration of TPA.  The patient understands and agrees to proceed. Presley Us MD 07/21 1817    The patient has a chronic kidney disease stage III failure with current creatinine of 1.9.  I discussed this with Dr. Bailey with the interventional stroke team.  With the most current research that shows that significant kidney damage complication is highly unlikely, we feel the patient would benefit from CT perfusion study determine if she has a significant vessel occlusion. Presley Us MD 07/21 1820   WBC, UA: (!) Too Numerous to Count (Reviewed) Presley Us MD 07/21 1927   Bacteria, UA: (!) 1+ (Reviewed) Presley Us MD 07/21 1927   Leuk Esterase, UA: (!) Large (3+) (Reviewed) Presley Us MD 07/21 1927    Case discussed with the intensivist who agrees with the plan for admission. Presley Us MD 07/21 1932                 Recent Results (from the past 24 hour(s))   aPTT    Collection Time: 07/21/17  6:12 PM   Result Value Ref Range    PTT 25.2 24.0 - 31.0 seconds   AST    Collection Time: 07/21/17  6:12 PM   Result Value Ref Range    AST (SGOT) 20 0 - 33 U/L   ALT    Collection Time: 07/21/17   6:12 PM   Result Value Ref Range    ALT (SGPT) 23 7 - 40 U/L   Light Blue Top    Collection Time: 07/21/17  6:12 PM   Result Value Ref Range    Extra Tube hold for add-on    Green Top (Gel)    Collection Time: 07/21/17  6:12 PM   Result Value Ref Range    Extra Tube Hold for add-ons.    Lavender Top    Collection Time: 07/21/17  6:12 PM   Result Value Ref Range    Extra Tube hold for add-on    Gold Top - SST    Collection Time: 07/21/17  6:12 PM   Result Value Ref Range    Extra Tube Hold for add-ons.    CBC Auto Differential    Collection Time: 07/21/17  6:12 PM   Result Value Ref Range    WBC 10.86 (H) 3.50 - 10.80 10*3/mm3    RBC 4.26 3.89 - 5.14 10*6/mm3    Hemoglobin 15.1 11.5 - 15.5 g/dL    Hematocrit 44.2 (H) 34.5 - 44.0 %    .8 (H) 80.0 - 99.0 fL    MCH 35.4 (H) 27.0 - 31.0 pg    MCHC 34.2 32.0 - 36.0 g/dL    RDW 13.8 11.3 - 14.5 %    RDW-SD 52.9 37.0 - 54.0 fl    MPV 9.2 6.0 - 12.0 fL    Platelets 214 150 - 450 10*3/mm3    Neutrophil % 69.9 41.0 - 71.0 %    Lymphocyte % 21.3 (L) 24.0 - 44.0 %    Monocyte % 6.3 0.0 - 12.0 %    Eosinophil % 1.8 0.0 - 3.0 %    Basophil % 0.2 0.0 - 1.0 %    Immature Grans % 0.5 0.0 - 0.6 %    Neutrophils, Absolute 7.60 1.50 - 8.30 10*3/mm3    Lymphocytes, Absolute 2.31 0.60 - 4.80 10*3/mm3    Monocytes, Absolute 0.68 0.00 - 1.00 10*3/mm3    Eosinophils, Absolute 0.20 0.00 - 0.30 10*3/mm3    Basophils, Absolute 0.02 0.00 - 0.20 10*3/mm3    Immature Grans, Absolute 0.05 (H) 0.00 - 0.03 10*3/mm3   POC Troponin, Rapid    Collection Time: 07/21/17  6:16 PM   Result Value Ref Range    Troponin I 0.02 0.00 - 0.07 ng/mL   Urinalysis With / Culture If Indicated    Collection Time: 07/21/17  6:57 PM   Result Value Ref Range    Color, UA Yellow Yellow, Straw    Appearance, UA Cloudy (A) Clear    pH, UA 6.5 5.0 - 8.0    Specific Gravity, UA 1.011 1.001 - 1.030    Glucose, UA Negative Negative    Ketones, UA Negative Negative    Bilirubin, UA Negative Negative    Blood, UA Moderate  (2+) (A) Negative    Protein, UA Trace (A) Negative    Leuk Esterase, UA Large (3+) (A) Negative    Nitrite, UA Negative Negative    Urobilinogen, UA 1.0 E.U./dL 0.2 - 1.0 E.U./dL   Urinalysis, Microscopic Only    Collection Time: 07/21/17  6:57 PM   Result Value Ref Range    RBC, UA 0-2 None Seen, 0-2 /HPF    WBC, UA Too Numerous to Count (A) None Seen /HPF    Bacteria, UA 1+ (A) None Seen, Trace /HPF    Squamous Epithelial Cells, UA 0-2 None Seen, 0-2 /HPF    Hyaline Casts, UA 0-6 0 - 6 /LPF    Methodology Automated Microscopy      Note: In addition to lab results from this visit, the labs listed above may include labs taken at another facility or during a different encounter within the last 24 hours. Please correlate lab times with ED admission and discharge times for further clarification of the services performed during this visit.    XR Chest 1 View   Final Result   Abnormal   Central pulmonary venous congestion versus pneumonitis.      Other findings as discussed above.        THIS DOCUMENT HAS BEEN ELECTRONICALLY SIGNED BY KAMILLE LARSEN MD      CT Cerebral Perfusion With & Without Contrast   Preliminary Result   Negative perfusion scan.       DICTATED:     07/21/2017   EDITED:         07/21/2017          CT Head Without Contrast Stroke Protocol   Preliminary Result   Brain appears within normal limits for age.       NOTE: Exam time is shown as 1800. Study was reviewed on the CT scan   monitor and discussed with Dr. Us at 1805.       DICTATED:     07/21/2017   EDITED:         07/21/2017              CT Head Without Contrast    (Results Pending)   MRI Brain Without Contrast    (Results Pending)     Vitals:    07/21/17 1855 07/21/17 1905 07/21/17 1909 07/21/17 1910   BP: 122/51 117/66  117/74   BP Location:       Patient Position:       Pulse: 85 73 76    Resp:       Temp:       TempSrc:       SpO2: 97% 96% 95%    Weight:       Height:         Medications   sodium chloride 0.9 % flush 10 mL (not  administered)   sodium chloride 0.9 % infusion (125 mL/hr Intravenous New Bag 7/21/17 2055)   sodium chloride 0.9 % flush 1-10 mL (not administered)   atorvastatin (LIPITOR) tablet 80 mg (80 mg Oral Given 7/21/17 2055)   clopidogrel (PLAVIX) tablet 75 mg (not administered)   cefTRIAXone (ROCEPHIN) IVPB 1 g (1 g Intravenous New Bag 7/21/17 2138)   nicotine (NICODERM CQ) 21 MG/24HR patch 1 patch (1 patch Transdermal Medication Applied 7/21/17 2115)   cefTRIAXone (ROCEPHIN) IVPB 1 g (not administered)   oxyCODONE-acetaminophen (PERCOCET)  MG per tablet 1 tablet (not administered)   tiZANidine (ZANAFLEX) tablet 4 mg (not administered)   gabapentin (NEURONTIN) capsule 400 mg (not administered)   DULoxetine (CYMBALTA) DR capsule 60 mg (not administered)   ondansetron (ZOFRAN) tablet 4 mg (not administered)   hydrochlorothiazide (HYDRODIURIL) tablet 25 mg (not administered)   ALPRAZolam (XANAX) tablet 1 mg (not administered)   ipratropium-albuterol (DUO-NEB) nebulizer solution 3 mL (not administered)   alteplase (ACTIVASE) bolus from vial (7.15 mg Intravenous Given 7/21/17 1819)   alteplase (ACTIVASE) 100 mg kit (0 mg Intravenous Stopped 7/21/17 1912)   sodium chloride 0.9 % infusion (0 mL/hr Intravenous Stopped 7/21/17 1927)   Morphine sulfate (PF) injection 4 mg (4 mg Intravenous Given 7/21/17 2054)   metoclopramide (REGLAN) injection 10 mg (10 mg Intravenous Given 7/21/17 1910)   ondansetron (ZOFRAN) injection 4 mg (4 mg Intravenous Given 7/21/17 1827)   sodium chloride 0.9 % bolus 1,000 mL (1,000 mL Intravenous New Bag 7/21/17 1909)   iopamidol (ISOVUE-370) 76 % injection 50 mL (40 mL Intravenous Given 7/21/17 1836)     ECG/EMG Results (last 24 hours)     Procedure Component Value Units Date/Time    ECG 12 Lead [[id number]] Collected:  07/21/17 1842     Updated:  07/21/17 1842            MDM  Number of Diagnoses or Management Options  Diagnosis management comments: ECG/EMG Results (last 24 hours)     Procedure  Component Value Units Date/Time    ECG 12 Lead ([id number]) Collected:  07/21/17 1842     Updated:  07/21/17 2151    Narrative:       Test Reason : Acute Stroke Protocol (onset < 12 hrs)  Blood Pressure : **/** mmHG  Vent. Rate : 077 BPM     Atrial Rate : 077 BPM     P-R Int : 144 ms          QRS Dur : 086 ms      QT Int : 410 ms       P-R-T Axes : 066 028 064 degrees     QTc Int : 463 ms    Normal sinus rhythm  When compared with ECG of 17-FEB-2017 05:57,  No significant change was found  Confirmed by JASBIR US MD ([id number]) on 7/21/2017 9:50:58 PM    Referred By:  DENISSE           Confirmed By:JASBIR US MD             Amount and/or Complexity of Data Reviewed  Clinical lab tests: reviewed  Tests in the radiology section of CPT®: reviewed  Decide to obtain previous medical records or to obtain history from someone other than the patient: yes  Obtain history from someone other than the patient: yes  Review and summarize past medical records: yes  Discuss the patient with other providers: yes  Independent visualization of images, tracings, or specimens: yes        Final diagnoses:   Cerebrovascular accident (CVA), unspecified mechanism   Acute intractable headache, unspecified headache type   CKD (chronic kidney disease), unspecified stage   Urinary tract infection without hematuria, site unspecified   Left hemiparesis       Documentation assistance provided by jeanmarie Fay.  Information recorded by the scribe was done at my direction and has been verified and validated by me.     Vita Fay  07/21/17 1925       Vita Fay  07/21/17 1933       Jasbir Us MD  07/21/17 2157

## 2017-07-22 ENCOUNTER — APPOINTMENT (OUTPATIENT)
Dept: MRI IMAGING | Facility: HOSPITAL | Age: 52
End: 2017-07-22

## 2017-07-22 VITALS
DIASTOLIC BLOOD PRESSURE: 65 MMHG | BODY MASS INDEX: 28.12 KG/M2 | TEMPERATURE: 97.7 F | OXYGEN SATURATION: 94 % | SYSTOLIC BLOOD PRESSURE: 134 MMHG | HEIGHT: 66 IN | RESPIRATION RATE: 16 BRPM | WEIGHT: 175 LBS | HEART RATE: 82 BPM

## 2017-07-22 LAB
ALBUMIN SERPL-MCNC: 3.5 G/DL (ref 3.2–4.8)
ANION GAP SERPL CALCULATED.3IONS-SCNC: 3 MMOL/L (ref 3–11)
ARTICHOKE IGE QN: 121 MG/DL (ref 0–130)
BASOPHILS # BLD AUTO: 0.04 10*3/MM3 (ref 0–0.2)
BASOPHILS NFR BLD AUTO: 0.5 % (ref 0–1)
BUN BLD-MCNC: 25 MG/DL (ref 9–23)
BUN BLDA-MCNC: 30 MG/DL (ref 8–26)
BUN/CREAT SERPL: 17.9 (ref 7–25)
CA-I BLDA-SCNC: 1.15 MMOL/L (ref 1.2–1.32)
CALCIUM SPEC-SCNC: 8.6 MG/DL (ref 8.7–10.4)
CHLORIDE BLDA-SCNC: 96 MMOL/L (ref 98–109)
CHLORIDE SERPL-SCNC: 108 MMOL/L (ref 99–109)
CHOLEST SERPL-MCNC: 176 MG/DL (ref 0–200)
CO2 BLDA-SCNC: 28 MMOL/L (ref 24–29)
CO2 SERPL-SCNC: 27 MMOL/L (ref 20–31)
CREAT BLD-MCNC: 1.4 MG/DL (ref 0.6–1.3)
CREAT BLDA-MCNC: 1.9 MG/DL (ref 0.6–1.3)
DEPRECATED RDW RBC AUTO: 56.8 FL (ref 37–54)
EOSINOPHIL # BLD AUTO: 0.14 10*3/MM3 (ref 0–0.3)
EOSINOPHIL NFR BLD AUTO: 1.7 % (ref 0–3)
ERYTHROCYTE [DISTWIDTH] IN BLOOD BY AUTOMATED COUNT: 14.3 % (ref 11.3–14.5)
GFR SERPL CREATININE-BSD FRML MDRD: 39 ML/MIN/1.73
GLUCOSE BLD-MCNC: 132 MG/DL (ref 70–100)
GLUCOSE BLDC GLUCOMTR-MCNC: 120 MG/DL (ref 70–130)
GLUCOSE BLDC GLUCOMTR-MCNC: 163 MG/DL (ref 70–130)
HBA1C MFR BLD: 5.1 % (ref 4.8–5.6)
HCT VFR BLD AUTO: 42.1 % (ref 34.5–44)
HCT VFR BLDA CALC: 47 % (ref 38–51)
HDLC SERPL-MCNC: 40 MG/DL (ref 40–60)
HGB BLD-MCNC: 13.5 G/DL (ref 11.5–15.5)
HGB BLDA-MCNC: 16 G/DL (ref 12–17)
IMM GRANULOCYTES # BLD: 0.02 10*3/MM3 (ref 0–0.03)
IMM GRANULOCYTES NFR BLD: 0.2 % (ref 0–0.6)
LYMPHOCYTES # BLD AUTO: 2.09 10*3/MM3 (ref 0.6–4.8)
LYMPHOCYTES NFR BLD AUTO: 26 % (ref 24–44)
MAGNESIUM SERPL-MCNC: 2.2 MG/DL (ref 1.3–2.7)
MCH RBC QN AUTO: 34.9 PG (ref 27–31)
MCHC RBC AUTO-ENTMCNC: 32.1 G/DL (ref 32–36)
MCV RBC AUTO: 108.8 FL (ref 80–99)
MONOCYTES # BLD AUTO: 0.8 10*3/MM3 (ref 0–1)
MONOCYTES NFR BLD AUTO: 10 % (ref 0–12)
NEUTROPHILS # BLD AUTO: 4.95 10*3/MM3 (ref 1.5–8.3)
NEUTROPHILS NFR BLD AUTO: 61.6 % (ref 41–71)
PHOSPHATE SERPL-MCNC: 4.3 MG/DL (ref 2.4–5.1)
PLAT MORPH BLD: NORMAL
PLATELET # BLD AUTO: 181 10*3/MM3 (ref 150–450)
PMV BLD AUTO: 9.2 FL (ref 6–12)
POTASSIUM BLD-SCNC: 3.3 MMOL/L (ref 3.5–5.5)
POTASSIUM BLDA-SCNC: 3.3 MMOL/L (ref 3.5–4.9)
RBC # BLD AUTO: 3.87 10*6/MM3 (ref 3.89–5.14)
RBC MORPH BLD: NORMAL
SODIUM BLD-SCNC: 138 MMOL/L (ref 132–146)
SODIUM BLDA-SCNC: 137 MMOL/L (ref 138–146)
TRIGL SERPL-MCNC: 185 MG/DL (ref 0–150)
WBC MORPH BLD: NORMAL
WBC NRBC COR # BLD: 8.04 10*3/MM3 (ref 3.5–10.8)

## 2017-07-22 PROCEDURE — 99238 HOSP IP/OBS DSCHRG MGMT 30/<: CPT | Performed by: NURSE PRACTITIONER

## 2017-07-22 PROCEDURE — 80061 LIPID PANEL: CPT | Performed by: PSYCHIATRY & NEUROLOGY

## 2017-07-22 PROCEDURE — 70551 MRI BRAIN STEM W/O DYE: CPT

## 2017-07-22 PROCEDURE — 85025 COMPLETE CBC W/AUTO DIFF WBC: CPT | Performed by: INTERNAL MEDICINE

## 2017-07-22 PROCEDURE — 85007 BL SMEAR W/DIFF WBC COUNT: CPT | Performed by: INTERNAL MEDICINE

## 2017-07-22 PROCEDURE — 82962 GLUCOSE BLOOD TEST: CPT

## 2017-07-22 PROCEDURE — 80069 RENAL FUNCTION PANEL: CPT | Performed by: INTERNAL MEDICINE

## 2017-07-22 PROCEDURE — 83036 HEMOGLOBIN GLYCOSYLATED A1C: CPT | Performed by: PSYCHIATRY & NEUROLOGY

## 2017-07-22 PROCEDURE — 99231 SBSQ HOSP IP/OBS SF/LOW 25: CPT | Performed by: PSYCHIATRY & NEUROLOGY

## 2017-07-22 PROCEDURE — 83735 ASSAY OF MAGNESIUM: CPT | Performed by: INTERNAL MEDICINE

## 2017-07-22 RX ADMIN — ALPRAZOLAM 1 MG: 1 TABLET ORAL at 02:22

## 2017-07-22 RX ADMIN — OXYCODONE HYDROCHLORIDE AND ACETAMINOPHEN 1 TABLET: 10; 325 TABLET ORAL at 05:30

## 2017-07-22 RX ADMIN — GABAPENTIN 400 MG: 400 CAPSULE ORAL at 05:30

## 2017-07-22 NOTE — H&P
Chief complaint:  Left-sided weakness    Subjective     Patient is a 52 y.o. female who developed the acute onset of left-sided weakness and paresthesias at 4:45 PM today.  She was walking with her sister along a road when the symptoms occurred.  She notes that she felt absolutely normal in every way up until that point.  She has no history of prior similar symptoms.  She has no history of seizures.  She has no history of migraines.  She does have chronic pain and is followed by pain clinic.  She takes gabapentin Percocet and Zanaflex regularly.  She is on Xanax for anxiety.    She presented to our emergency department and had an initial NIH stroke scale of 6.  It was elected to give her TPA.  Her perfusion scan was negative.  CT scan was negative for any acute abnormalities.  Dr. Pal with neurology has seen her in consultation.  I have been asked to admit her to the ICU.      History  Past Medical History:   Diagnosis Date   • Anxiety    • Asthma    • Cervical cancer    • Chronic bronchitis    • COPD (chronic obstructive pulmonary disease)    • Depression    • Kidney disease      Past Surgical History:   Procedure Laterality Date   • CYSTECTOMY     • HYSTERECTOMY       Family History   Problem Relation Age of Onset   • Colon cancer Mother    • Stroke Father    • Stroke Brother    • Heart attack Maternal Uncle    • Bone cancer Maternal Grandmother      Social History   Substance Use Topics   • Smoking status: Current Every Day Smoker   • Smokeless tobacco: Never Used   • Alcohol use No     Prescriptions Prior to Admission   Medication Sig Dispense Refill Last Dose   • albuterol (PROVENTIL) (2.5 MG/3ML) 0.083% nebulizer solution Take 2.5 mg by nebulization Every 6 (Six) Hours As Needed for wheezing or shortness of air. 75 mL 2 Taking   • ALPRAZolam (XANAX) 1 MG tablet Take 1 tablet by mouth 3 (Three) Times a Day As Needed for Anxiety. 90 tablet 0 Taking   • COMBIVENT RESPIMAT  MCG/ACT inhaler Inhale 1  "puff 4 (Four) Times a Day. 4 g 5    • DULoxetine (CYMBALTA) 60 MG capsule Take 1 capsule by mouth 2 (Two) Times a Day. 60 capsule 3 Taking   • estradiol (ESTRACE) 1 MG tablet Take 1 tablet by mouth Daily. 30 tablet 5 Taking   • gabapentin (NEURONTIN) 800 MG tablet TAKE ONE TABLET BY MOUTH THREE TIMES A DAY 90 tablet 3 Taking   • hydrochlorothiazide (HYDRODIURIL) 25 MG tablet Take 1 tablet by mouth Daily. 30 tablet 5 Taking   • mupirocin (BACTROBAN) 2 % ointment Apply  topically 3 (Three) Times a Day. 1 each 0 Taking   • ondansetron (ZOFRAN) 4 MG tablet Take 1 tablet by mouth Every 8 (Eight) Hours As Needed for Nausea or Vomiting. 20 tablet 3 Taking   • oxyCODONE-acetaminophen (PERCOCET)  MG per tablet Take 1 tablet by mouth Every 6 (Six) Hours As Needed for moderate pain (4-6). 120 tablet 0 Taking   • PredniSONE 5 MG tablet therapy pack dosepak Take as directed on package instructions. 21 each 0    • promethazine (PHENERGAN) 25 MG tablet Take 1 tablet by mouth Every 8 (Eight) Hours As Needed for Nausea or Vomiting. 30 tablet 1 Taking   • tiZANidine (ZANAFLEX) 4 MG tablet TAKE 1 TABLET BY MOUTH EVERY 8 HOURS 90 tablet 2 Taking   • triamcinolone (KENALOG) 0.025 % ointment Apply  topically 2 (Two) Times a Day. 15 g 1 Taking   • VENTOLIN  (90 BASE) MCG/ACT inhaler Inhale 2 puffs Every 6 (Six) Hours As Needed for Wheezing. 6.7 g 3      Allergies:  Nsaids and Toradol [ketorolac tromethamine]    Review of Systems   Pertinent items are noted in HPI, all other systems reviewed and negative      Objective     Vital Signs  Temp:  [98.3 °F (36.8 °C)-99.3 °F (37.4 °C)] 99.3 °F (37.4 °C)  Heart Rate:  [73-91] 76  Resp:  [17-20] 18  BP: (117-167)/(51-80) 117/74    Physical Exam:    Objective:  General Appearance:  In no acute distress.    Vital signs: (most recent): Blood pressure 117/74, pulse 76, temperature 99.3 °F (37.4 °C), temperature source Oral, resp. rate 18, height 66\" (167.6 cm), weight 175 lb (79.4 kg), " SpO2 95 %.    HEENT: Normal HEENT exam.    Lungs:  Normal respiratory rate and normal effort.  She is not in respiratory distress.  Breath sounds clear to auscultation.  No wheezes, rales or rhonchi.    Heart: Normal rate.  Regular rhythm.  S1 normal and S2 normal.  No murmur, gallop or friction rub.   Chest: Symmetric chest wall expansion.   Abdomen: Abdomen is soft and non-distended.  Bowel sounds are normal.   There is no abdominal tenderness.   There is no mass. There is no splenomegaly. There is no hepatomegaly.   Extremities: There is no deformity or dependent edema.    Neurological: Patient is alert and oriented to person, place and time.  (Mild left hand weakness.  No facial droop.).    Pupils:  Pupils are equal, round, and reactive to light.    Skin:  Warm and dry.              Results Review:    I reviewed the patient's new clinical results.  I reviewed the patient's new imaging results and agree with the interpretation.  I reviewed the patient's other test results and agree with the interpretation    Assessment/Plan     Assessment:    Hospital Problem List     * (Principal)Left hemiparesis    Panlobular emphysema    Depression    Nerve pain    Anxiety    Chest pain    Chronic kidney disease, stage 3    Essential hypertension    Urinary tract infection without hematuria          Apparent CVA though presentation is atypical enough that some diagnostic uncertainty persists.  Patient was given tPA nevertheless due to the lack of any other reasonable explanation.  Plans for standard post tPA protocol with echocardiogram, carotid studies, and 24-hour CT scan.  She is are passed her dysphagia evaluation.  MRI has been ordered by neurology.    Plan:    -Post tPA order set  -Admit to ICU  -24-hour CT scan  -Carotid studies  -Echocardiogram  -MRI  -Resume home pain medications  -Resume anxiolytics  -Nicotine replacement    I discussed the patients findings and my recommendations with patient and nursing staff.      Alan Christopher MD  Pulmonary and Critical Care Medicine  07/21/17  9:57 PM

## 2017-07-22 NOTE — NURSING NOTE
CALLED INTO PATIENT ROOM.   PATIENT STATES THAT SHE HAS TO GO OUT TO SMOKE.   INFORMED PATIENT THAT SHE HAS ORDERS TO REMAIN ON BEDREST.  PATIENT INSISTS THAT SHE IS GOING TO LEAVE.   KAROL HERNANDEZ  NOTIFIED.

## 2017-07-22 NOTE — NURSING NOTE
IV DISCONTINUED.   AMA PAPERS SIGNED PER PATIENT.  PATIENT STATES THAT HER BROTHER IS GOING TO COME TO PICK HER UP.

## 2017-07-22 NOTE — DISCHARGE SUMMARY
Admit date: 7/21/2017  Date of Discharge:  7/22/2017    Admit Diagnosis  Hospital Problem List      * (Principal)Left hemiparesis     Panlobular emphysema     Depression     Nerve pain     Anxiety     Chest pain     Chronic kidney disease, stage 3     Essential hypertension     Urinary tract infection without hematuria         Discharge Diagnosis:   Problem List Items Addressed This Visit        Unprioritized    Urinary tract infection without hematuria    * (Principal)Left hemiparesis - Primary      Other Visit Diagnoses     Cerebrovascular accident (CVA), unspecified mechanism        Acute intractable headache, unspecified headache type        CKD (chronic kidney disease), unspecified stage                Hospital Course:  Patient is a 52 y.o. female presented with L hemiparesis @ 1645 to ou rED on 7/21/17.  Her initial NIH was 6 and she was evaluated by Dr. Pal w/ Neurology and was felt to be a tPA candidate.   The patient was admitted to the ICU per the tPA protocol.  The patient expressed a desire to leave AMA @ 0600 as she wanted to go outside to smoke a cigarette.  I came to the bedside and verified that the patient was of sound mind and capable of making this self-determination.  I spoke w/ the patient and explained the risks of leaving AMA given the unclear etiology of her symptoms and her recent treatment w/ tPA.  I offered other nicotine replacement therapies which she declined.  She stated understanding of the risks w/ leaving AMA and accepted responsibility for any consequences.  She has contacted a family member to come take her home.   The RN is disconnecting her from supportive care and the patient will be escorted to her associates vehicle.      Procedures Performed:  None      Consults:   Dr. OLGA Pal, Neurology      Pertinent Test Results:     Results from last 7 days  Lab Units 07/22/17  0435   WBC 10*3/mm3 8.04   HEMOGLOBIN g/dL 13.5   HEMATOCRIT % 42.1   PLATELETS 10*3/mm3 181        Results from last 7 days  Lab Units 07/22/17  0435   SODIUM mmol/L 138   POTASSIUM mmol/L 3.3*   CHLORIDE mmol/L 108   CO2 mmol/L 27.0   BUN mg/dL 25*   CREATININE mg/dL 1.40*   GLUCOSE mg/dL 132*   CALCIUM mg/dL 8.6*   PHOSPHORUS mg/dL 4.3         CT of the head - 7/21/17, 1805  FINDINGS: The calvarium appears intact. Included paranasal sinuses and  mastoid air cells appear clear. Soft tissue window images show the brain  to appear within normal limits. There is no evidence of hemorrhage,  contusion, edema, mass or mass effect, acute or old infarct,  hydrocephalus, or abnormal extra-axial collection. There is very mild  generalized cerebral atrophy within expected limits for patient's age.    MRI of the brain - 7/22/17, 0214  Pending    Condition on Discharge:  Of sound mind and body and leaving AMA under her own recognizance.      Vital Signs  Temp:  [97.7 °F (36.5 °C)-99.3 °F (37.4 °C)] 97.7 °F (36.5 °C)  Heart Rate:  [65-91] 70  Resp:  [16-20] 16  BP: ()/(46-80) 101/46    Discharge Disposition:  Oriented x4, leaving against AMA      Discharge Medications:  Patient will not be provided any additional medications upon discharge.  Patient is advised to continue her home medicatoins and f/u w/ her PCP.    Discharge Diet: as tolerated    Activity at Discharge: as tolerated    Follow-up Appointments  Future Appointments  Date Time Provider Department Center   8/9/2017 9:00 AM Brooks Mims III, MD [facility] None   8/30/2017 11:45 AM Rubia Low DO [facility] None         Test Results Pending at Discharge   Order Current Status    Hemoglobin A1c In process    Urine Culture In process      MRI of the head w/o contrast - Pending     DAVID Condon  07/22/17  6:14 AM    Time: Discharge 20 min

## 2017-07-22 NOTE — PLAN OF CARE
Problem: Patient Care Overview (Adult)  Goal: Plan of Care Review  Outcome: Ongoing (interventions implemented as appropriate)  Goal: Discharge Needs Assessment  Outcome: Ongoing (interventions implemented as appropriate)    Problem: Stroke (Ischemic) (Adult)  Goal: Signs and Symptoms of Listed Potential Problems Will be Absent or Manageable (Stroke)  Outcome: Ongoing (interventions implemented as appropriate)

## 2017-07-22 NOTE — PROGRESS NOTES
"Darcy Palomino    Subjective     CC: left hemiparesis    History of Present Illness     Darcy Palomino is admitted with left hemiparesis. She reports that her symptoms have resolved and that she is back to baseline.      There have been no other changes in the patient's interval history since my consult note of 7/21/17.      The following portions of the patient's history were reviewed and updated as appropriate: allergies, current medications, past family history, past medical history, past social history, past surgical history and problem list.    Review of Systems   Constitutional: Negative.        Objective     /65 (BP Location: Left arm, Patient Position: Lying)  Pulse 82  Temp 97.7 °F (36.5 °C) (Axillary)   Resp 16  Ht 66\" (167.6 cm)  Wt 175 lb (79.4 kg)  SpO2 94%  BMI 28.25 kg/m2    Physical Exam   Constitutional: She is oriented to person, place, and time.   Neurological: She is oriented to person, place, and time.   Psychiatric: Her speech is normal.        Neurologic Exam     Mental Status   Oriented to person, place, and time.   Speech: speech is normal   Level of consciousness: alert  Normal comprehension.     Cranial Nerves   Cranial nerves II through XII intact.     Motor Exam   Muscle bulk: normal  Overall muscle tone: normal       Still mild weakness on left, but no drift, and effort is unclear       Laboratory and radiological testing is notable for an MRI of the brain. On my review, I see no evidence for stroke, though I am awaiting radiology interpretation.    Assessment/Plan         Darcy Palomino is admitted with left hemiparesis. I am unable to confirm stroke at this time, and have no additional recommendations this morning.    As part of this visit I reviewed radiology images.  "

## 2017-07-24 ENCOUNTER — OFFICE VISIT (OUTPATIENT)
Dept: INTERNAL MEDICINE | Facility: CLINIC | Age: 52
End: 2017-07-24

## 2017-07-24 VITALS
HEART RATE: 96 BPM | BODY MASS INDEX: 28.25 KG/M2 | TEMPERATURE: 96.8 F | DIASTOLIC BLOOD PRESSURE: 74 MMHG | WEIGHT: 175 LBS | SYSTOLIC BLOOD PRESSURE: 118 MMHG | RESPIRATION RATE: 22 BRPM

## 2017-07-24 DIAGNOSIS — I10 ESSENTIAL HYPERTENSION: Primary | ICD-10-CM

## 2017-07-24 DIAGNOSIS — F41.9 ANXIETY: ICD-10-CM

## 2017-07-24 DIAGNOSIS — F32.A DEPRESSION, UNSPECIFIED DEPRESSION TYPE: ICD-10-CM

## 2017-07-24 DIAGNOSIS — R53.1 LEFT-SIDED WEAKNESS: ICD-10-CM

## 2017-07-24 DIAGNOSIS — J43.1 PANLOBULAR EMPHYSEMA (HCC): ICD-10-CM

## 2017-07-24 DIAGNOSIS — N18.30 CHRONIC KIDNEY DISEASE, STAGE 3 (HCC): ICD-10-CM

## 2017-07-24 PROBLEM — G81.94 LEFT HEMIPARESIS (HCC): Status: RESOLVED | Noted: 2017-07-21 | Resolved: 2017-07-24

## 2017-07-24 PROBLEM — N39.0 URINARY TRACT INFECTION WITHOUT HEMATURIA: Status: RESOLVED | Noted: 2017-06-15 | Resolved: 2017-07-24

## 2017-07-24 PROBLEM — R07.9 CHEST PAIN: Status: RESOLVED | Noted: 2017-02-17 | Resolved: 2017-07-24

## 2017-07-24 PROCEDURE — 99214 OFFICE O/P EST MOD 30 MIN: CPT | Performed by: INTERNAL MEDICINE

## 2017-07-24 NOTE — PROGRESS NOTES
Subjective   Darcy Palomino is a 52 y.o. female.   Pt is here to follow up on HTN,CKD III,depression/anxiety.             History of Present Illness   Pt is here to follow up on HTN,CKD III,emphysema and depression/anxiety.   Pt was in the ER on 7/21 due to left hemiparesis, they felt she was a candidate for tPA, to my understanding she was given tPA (but MRI did not show acute infarct)  and  she left AMA because she wanted to go outside and smoke a cigarette.   She is scheduled to see Dr.John Mims ([facility]) on August 9.     I have offered to try Buspar or hydroxyzine in Xanax place until she can get in with Psych, but she states that she has tried everything and only wants Xanax. Last UDS she stated she was taking Xanax daily but it was not in her system.  She states the only thing that helps is valium, xanax or klonopin.     HTN,CKD III are controlled.     The following portions of the patient's history were reviewed and updated as appropriate: allergies, current medications, past family history, past medical history, past social history, past surgical history and problem list.            Review of Systems   Constitutional: Negative.    HENT: Negative.    Eyes: Negative.    Respiratory: Negative.    Cardiovascular: Negative.         See HPI.    Gastrointestinal: Negative.    Endocrine: Negative.    Genitourinary: Negative.    Musculoskeletal: Negative.    Skin: Negative.    Allergic/Immunologic: Negative.    Neurological:        See HPI.    Hematological: Negative.    Psychiatric/Behavioral:        See HPI.                 Objective   Physical Exam   Constitutional: She is oriented to person, place, and time. She appears well-developed and well-nourished.   HENT:   Head: Normocephalic and atraumatic.   Right Ear: External ear normal.   Left Ear: External ear normal.   Nose: Nose normal.   Mouth/Throat: Oropharynx is clear and moist.   Eyes: Conjunctivae and EOM are normal. Pupils are equal, round, and reactive to  light.   Neck: Normal range of motion. Neck supple.   Cardiovascular: Normal rate, regular rhythm, normal heart sounds and intact distal pulses.    Bilateral lower extremity swelling plus 1, Pt states this has improved since she saw nephrology last week.    Pulmonary/Chest: Effort normal and breath sounds normal.   Abdominal: Soft. Bowel sounds are normal.   Musculoskeletal:   Left upper extremity strength 3- 4/5   Left lower extremity strength 4/5   Neurological: She is alert and oriented to person, place, and time. She has normal reflexes.   Skin: Skin is warm and dry.   Psychiatric: She has a normal mood and affect.   Nursing note and vitals reviewed.              Assessment/Plan    Essential hypertension    Chronic kidney disease, stage 3    Depression, unspecified depression type    Anxiety  -     Ambulatory Referral to Psychiatry    Panlobular emphysema    Left-sided weakness  -     US Carotid Bilateral; Future        1.) Refer to Psychiatry   2.) schedule US carotid doppler ( Pt does not want to see neurology at this time but will think about this hopefully in future she will agree,I have also discussed taking at least an Aspirin 81 mg daily if she wont do anything else, all she is worried about is getting her Xanax as far as today's visit )   3.) Discussed compliance as she left hospital AMA last week which was extremely dangerous.   4.) Keep scheduled follow up for next month.

## 2017-07-25 ENCOUNTER — TELEPHONE (OUTPATIENT)
Dept: INTERNAL MEDICINE | Facility: CLINIC | Age: 52
End: 2017-07-25

## 2017-07-25 DIAGNOSIS — R53.1 LEFT-SIDED WEAKNESS: Primary | ICD-10-CM

## 2017-07-25 NOTE — TELEPHONE ENCOUNTER
----- Message from Sierra Crespo sent at 7/25/2017  8:40 AM EDT -----  Concerning pts Carotid US, they are needing order to be put in as duplex scan instead. Can you reenter order?

## 2017-07-27 LAB — BACTERIA SPEC AEROBE CULT: ABNORMAL

## 2017-08-17 ENCOUNTER — OFFICE VISIT (OUTPATIENT)
Dept: INTERNAL MEDICINE | Facility: CLINIC | Age: 52
End: 2017-08-17

## 2017-08-17 VITALS
WEIGHT: 171.6 LBS | RESPIRATION RATE: 20 BRPM | BODY MASS INDEX: 27.7 KG/M2 | DIASTOLIC BLOOD PRESSURE: 88 MMHG | TEMPERATURE: 97.4 F | HEART RATE: 86 BPM | SYSTOLIC BLOOD PRESSURE: 140 MMHG

## 2017-08-17 DIAGNOSIS — R21 RASH: Primary | ICD-10-CM

## 2017-08-17 PROCEDURE — 99213 OFFICE O/P EST LOW 20 MIN: CPT | Performed by: INTERNAL MEDICINE

## 2017-08-17 RX ORDER — PROMETHAZINE HYDROCHLORIDE 12.5 MG/1
12.5 TABLET ORAL EVERY 8 HOURS PRN
Qty: 20 TABLET | Refills: 0 | Status: SHIPPED | OUTPATIENT
Start: 2017-08-17 | End: 2017-08-30 | Stop reason: SDUPTHER

## 2017-08-17 RX ORDER — POTASSIUM CHLORIDE 750 MG/1
10 TABLET, EXTENDED RELEASE ORAL DAILY
COMMUNITY
Start: 2017-08-08 | End: 2020-12-26

## 2017-08-17 NOTE — PROGRESS NOTES
Subjective   Darcy Palomino is a 52 y.o. female.   Pt presents today with the acute complaint of rash.               History of Present Illness   Pt presents today with the acute complaint of rash X 4 days. She states it is all over like small bites with itching. She denies bed bugs but states her dogs may have fleas. She has not tried OTC products to resolve this issue. She has caused excoriation to the dorsal side of her left foot.       The following portions of the patient's history were reviewed and updated as appropriate: allergies, current medications, past family history, past medical history, past social history, past surgical history and problem list.             Review of Systems   Constitutional: Negative.    HENT: Negative.    Eyes: Negative.    Respiratory: Negative.    Cardiovascular: Negative.    Gastrointestinal: Negative.    Endocrine: Negative.    Genitourinary: Negative.    Musculoskeletal: Negative.    Skin:        See HPI.    Allergic/Immunologic: Negative.    Neurological: Negative.    Hematological: Negative.    Psychiatric/Behavioral: Negative.                 Objective   Physical Exam   Constitutional: She is oriented to person, place, and time. She appears well-developed and well-nourished.   HENT:   Head: Normocephalic and atraumatic.   Eyes: Conjunctivae and EOM are normal.   Cardiovascular: Normal rate, regular rhythm and normal heart sounds.    Pulmonary/Chest: Effort normal and breath sounds normal.   Abdominal: Soft. Bowel sounds are normal.   Neurological: She is alert and oriented to person, place, and time. She has normal reflexes.   Skin: Skin is warm and dry.   Small pin point like bites on several areas of trunk, legs and dorsal side of left foot, right forearm. Pt has scratched until she has caused several areas of excoriation.    Psychiatric: She has a normal mood and affect.   Nursing note and vitals reviewed.             Assessment/Plan      Rash  -     mupirocin  (BACTROBAN) 2 % ointment; Apply  topically 2 (Two) Times a Day.  -     PredniSONE 5 MG tablet therapy pack dosepak; Take as directed on package instructions.        1.) mupirocin cream apply to affected area on left foot BID prn   2.) Can use benadryl cream as well for all other areas affected with rash   3.) Prednisone taper as directed.   4.) Keep scheduled follow up.

## 2017-08-30 ENCOUNTER — OFFICE VISIT (OUTPATIENT)
Dept: INTERNAL MEDICINE | Facility: CLINIC | Age: 52
End: 2017-08-30

## 2017-08-30 VITALS
DIASTOLIC BLOOD PRESSURE: 80 MMHG | SYSTOLIC BLOOD PRESSURE: 128 MMHG | BODY MASS INDEX: 26.6 KG/M2 | RESPIRATION RATE: 18 BRPM | WEIGHT: 164.8 LBS | HEART RATE: 88 BPM

## 2017-08-30 DIAGNOSIS — I10 ESSENTIAL HYPERTENSION: Primary | ICD-10-CM

## 2017-08-30 DIAGNOSIS — F32.A DEPRESSION, UNSPECIFIED DEPRESSION TYPE: ICD-10-CM

## 2017-08-30 DIAGNOSIS — N39.0 URINARY TRACT INFECTION, SITE NOT SPECIFIED: ICD-10-CM

## 2017-08-30 DIAGNOSIS — R82.90 MALODOROUS URINE: ICD-10-CM

## 2017-08-30 DIAGNOSIS — N18.30 CHRONIC KIDNEY DISEASE, STAGE 3 (HCC): ICD-10-CM

## 2017-08-30 DIAGNOSIS — F41.9 ANXIETY: ICD-10-CM

## 2017-08-30 LAB
BILIRUB BLD-MCNC: NEGATIVE MG/DL
CLARITY, POC: ABNORMAL
COLOR UR: YELLOW
EXPIRATION DATE: ABNORMAL
GLUCOSE UR STRIP-MCNC: NEGATIVE MG/DL
KETONES UR QL: NEGATIVE
LEUKOCYTE EST, POC: ABNORMAL
Lab: ABNORMAL
NITRITE UR-MCNC: POSITIVE MG/ML
PH UR: 9 [PH] (ref 5–8)
PROT UR STRIP-MCNC: NEGATIVE MG/DL
RBC # UR STRIP: ABNORMAL /UL
SP GR UR: 1.01 (ref 1–1.03)
UROBILINOGEN UR QL: NORMAL

## 2017-08-30 PROCEDURE — 81002 URINALYSIS NONAUTO W/O SCOPE: CPT | Performed by: INTERNAL MEDICINE

## 2017-08-30 PROCEDURE — 99214 OFFICE O/P EST MOD 30 MIN: CPT | Performed by: INTERNAL MEDICINE

## 2017-08-30 RX ORDER — BUPROPION HYDROCHLORIDE 150 MG/1
150 TABLET ORAL DAILY
Qty: 30 TABLET | Refills: 2 | Status: SHIPPED | OUTPATIENT
Start: 2017-08-30 | End: 2017-11-27 | Stop reason: SDUPTHER

## 2017-08-30 RX ORDER — PROMETHAZINE HYDROCHLORIDE 12.5 MG/1
12.5 TABLET ORAL EVERY 8 HOURS PRN
Qty: 30 TABLET | Refills: 0 | Status: SHIPPED | OUTPATIENT
Start: 2017-08-30 | End: 2017-09-22 | Stop reason: SDUPTHER

## 2017-08-30 RX ORDER — RANITIDINE 150 MG/1
150 CAPSULE ORAL EVERY EVENING
Qty: 30 CAPSULE | Refills: 3 | Status: SHIPPED | OUTPATIENT
Start: 2017-08-30 | End: 2017-11-27

## 2017-08-30 NOTE — PROGRESS NOTES
"Subjective   Darcy Palomino is a 52 y.o. female.   Pt is here to follow up on HTN,depression/anxiety and CKD III.           History of Present Illness   Pt is here to follow up on HTN and CKD III.   Both issues are stable.   She still has not completed her carotid duplex at this time (last month after stroke admission with treatment she left AMA before work up was complete). She states she missed the appointment.   She follows with nephrology, due to nephrostomy.   Pt would like to try something different for depression, while Cymbalta she states helps some. She has tried Zoloft, Lexapro and Celexa.   Pt does not take phenergan every day but states she does need a refill.   Pt wants to wean off Zanaflex.   Pt has malodorous urine and wants urinalysis today.   She would also like a \"stomach pill\" called in for occasional heart burn.             The following portions of the patient's history were reviewed and updated as appropriate: allergies, current medications, past family history, past medical history, past social history, past surgical history and problem list.             Review of Systems   Constitutional: Negative.    HENT: Negative.    Eyes: Negative.    Respiratory: Negative.    Cardiovascular:        See HPI.    Gastrointestinal: Negative.    Endocrine: Negative.    Genitourinary: Negative.    Musculoskeletal: Negative.    Skin: Negative.    Allergic/Immunologic: Negative.    Neurological: Negative.    Hematological: Negative.    Psychiatric/Behavioral:        See HPI.              Objective   Physical Exam   Constitutional: She is oriented to person, place, and time. She appears well-developed and well-nourished.   HENT:   Head: Normocephalic and atraumatic.   Right Ear: External ear normal.   Left Ear: External ear normal.   Nose: Nose normal.   Mouth/Throat: Oropharynx is clear and moist.   Eyes: Conjunctivae and EOM are normal. Pupils are equal, round, and reactive to light.   Neck: Normal range of " motion. Neck supple. No tracheal deviation present. No thyromegaly present.   Cardiovascular: Normal rate, regular rhythm, normal heart sounds and intact distal pulses.    Pulmonary/Chest: Effort normal and breath sounds normal.   Abdominal: Soft. Bowel sounds are normal. She exhibits no distension. There is no tenderness.   Neurological: She is alert and oriented to person, place, and time. She has normal reflexes.   Skin: Skin is warm and dry.   Psychiatric: She has a normal mood and affect.   Nursing note and vitals reviewed.               Assessment/Plan    Essential hypertension    Chronic kidney disease, stage 3    Depression, unspecified depression type  -     buPROPion XL (WELLBUTRIN XL) 150 MG 24 hr tablet; Take 1 tablet by mouth Daily.    Anxiety  -     buPROPion XL (WELLBUTRIN XL) 150 MG 24 hr tablet; Take 1 tablet by mouth Daily.    Other orders  -     promethazine (PHENERGAN) 12.5 MG tablet; Take 1 tablet by mouth Every 8 (Eight) Hours As Needed for Nausea or Vomiting.        1.) Start Wellbutrin  mg PO Daily   2.) Follow up in 3 months   3.) Pt wants to stop Zanaflex, instructed to take BID for 1 week and then daily X 1 week and then stop.   4.) Zantac 150 mg PO Daily prn sent to pharmacy.

## 2017-08-31 DIAGNOSIS — M79.2 NERVE PAIN: ICD-10-CM

## 2017-08-31 RX ORDER — SULFAMETHOXAZOLE AND TRIMETHOPRIM 800; 160 MG/1; MG/1
1 TABLET ORAL 2 TIMES DAILY
Qty: 14 TABLET | Refills: 0 | Status: SHIPPED | OUTPATIENT
Start: 2017-08-31 | End: 2017-10-09

## 2017-08-31 RX ORDER — DULOXETIN HYDROCHLORIDE 60 MG/1
CAPSULE, DELAYED RELEASE ORAL
Qty: 60 CAPSULE | Refills: 2 | Status: SHIPPED | OUTPATIENT
Start: 2017-08-31 | End: 2017-11-30 | Stop reason: SDUPTHER

## 2017-09-01 LAB
BACTERIA #/AREA URNS HPF: ABNORMAL /HPF
BACTERIA UR CULT: NORMAL
BACTERIA UR CULT: NORMAL
CASTS URNS MICRO: ABNORMAL
CRYSTALS URNS MICRO: ABNORMAL
EPI CELLS #/AREA URNS HPF: ABNORMAL /HPF
RBC #/AREA URNS HPF: ABNORMAL /HPF
WBC #/AREA URNS HPF: ABNORMAL /HPF

## 2017-09-22 RX ORDER — PROMETHAZINE HYDROCHLORIDE 12.5 MG/1
TABLET ORAL
Qty: 30 TABLET | Refills: 0 | Status: SHIPPED | OUTPATIENT
Start: 2017-09-22 | End: 2017-10-09 | Stop reason: SDUPTHER

## 2017-10-09 ENCOUNTER — OFFICE VISIT (OUTPATIENT)
Dept: INTERNAL MEDICINE | Facility: CLINIC | Age: 52
End: 2017-10-09

## 2017-10-09 VITALS
DIASTOLIC BLOOD PRESSURE: 94 MMHG | BODY MASS INDEX: 26.47 KG/M2 | RESPIRATION RATE: 18 BRPM | WEIGHT: 164 LBS | HEART RATE: 94 BPM | SYSTOLIC BLOOD PRESSURE: 134 MMHG | OXYGEN SATURATION: 99 % | TEMPERATURE: 97.6 F

## 2017-10-09 DIAGNOSIS — K21.9 GASTROESOPHAGEAL REFLUX DISEASE, ESOPHAGITIS PRESENCE NOT SPECIFIED: ICD-10-CM

## 2017-10-09 DIAGNOSIS — N39.0 URINARY TRACT INFECTION WITH HEMATURIA, SITE UNSPECIFIED: ICD-10-CM

## 2017-10-09 DIAGNOSIS — I10 ESSENTIAL HYPERTENSION: ICD-10-CM

## 2017-10-09 DIAGNOSIS — R23.2 HOT FLASHES: ICD-10-CM

## 2017-10-09 DIAGNOSIS — F41.9 ANXIETY: ICD-10-CM

## 2017-10-09 DIAGNOSIS — R31.9 URINARY TRACT INFECTION WITH HEMATURIA, SITE UNSPECIFIED: ICD-10-CM

## 2017-10-09 DIAGNOSIS — I63.9 CEREBROVASCULAR ACCIDENT (CVA), UNSPECIFIED MECHANISM (HCC): ICD-10-CM

## 2017-10-09 DIAGNOSIS — R53.83 FATIGUE, UNSPECIFIED TYPE: Primary | ICD-10-CM

## 2017-10-09 DIAGNOSIS — R79.89 ELEVATED SERUM CREATININE: ICD-10-CM

## 2017-10-09 LAB
BILIRUB BLD-MCNC: NEGATIVE MG/DL
CLARITY, POC: ABNORMAL
COLOR UR: ABNORMAL
EXPIRATION DATE: ABNORMAL
GLUCOSE UR STRIP-MCNC: NEGATIVE MG/DL
KETONES UR QL: NEGATIVE
LEUKOCYTE EST, POC: ABNORMAL
Lab: ABNORMAL
NITRITE UR-MCNC: POSITIVE MG/ML
PH UR: 9 [PH] (ref 5–8)
PROT UR STRIP-MCNC: NEGATIVE MG/DL
RBC # UR STRIP: ABNORMAL /UL
SP GR UR: 1.01 (ref 1–1.03)
UROBILINOGEN UR QL: NORMAL

## 2017-10-09 PROCEDURE — 99214 OFFICE O/P EST MOD 30 MIN: CPT | Performed by: PHYSICIAN ASSISTANT

## 2017-10-09 RX ORDER — ESTRADIOL 0.5 MG/1
0.5 TABLET ORAL DAILY
Qty: 30 TABLET | Refills: 2 | Status: SHIPPED | OUTPATIENT
Start: 2017-10-09

## 2017-10-09 RX ORDER — HYDROXYZINE 50 MG/1
50 TABLET, FILM COATED ORAL 3 TIMES DAILY PRN
Qty: 30 TABLET | Refills: 1 | Status: SHIPPED | OUTPATIENT
Start: 2017-10-09 | End: 2021-03-10 | Stop reason: SDUPTHER

## 2017-10-09 RX ORDER — ASPIRIN 81 MG/1
81 TABLET ORAL DAILY
Qty: 90 TABLET | Refills: 2 | Status: SHIPPED | OUTPATIENT
Start: 2017-10-09 | End: 2020-12-26

## 2017-10-09 RX ORDER — METOPROLOL SUCCINATE 25 MG/1
25 TABLET, EXTENDED RELEASE ORAL DAILY
Qty: 30 TABLET | Refills: 2 | Status: SHIPPED | OUTPATIENT
Start: 2017-10-09

## 2017-10-09 RX ORDER — PROMETHAZINE HYDROCHLORIDE 25 MG/1
25 TABLET ORAL EVERY 8 HOURS PRN
Qty: 30 TABLET | Refills: 2 | Status: SHIPPED | OUTPATIENT
Start: 2017-10-09 | End: 2020-12-26

## 2017-10-09 NOTE — PROGRESS NOTES
Subjective   Darcy Palomino is a 52 y.o. female.   Chief Complaint   Patient presents with   • Fatigue   • Nausea   • Cough   • Anxiety       History of Present Illness   PT complains of cough, nausea, not eating, chills x 2 days, fatigue and generalized weakness.  Anxiety is worsened.  Acid taste in her mouth.  Has used phenergan.  Klonopin was stopped in the last several months.    Hx of CVA with left hemiparesis and left hospital AMA.  Not currently treated for HTN, HLD or antiplatelet.      HTn--BP recheck [id number]  Lipid panel several months ago is acceptable.  Smoking 1/2 ppd.  The following portions of the patient's history were reviewed and updated as appropriate: allergies, current medications and problem list.    Review of Systems   Constitutional: Positive for appetite change, chills and diaphoresis.   HENT: Positive for rhinorrhea.    Respiratory: Positive for cough. Negative for shortness of breath.    Gastrointestinal: Positive for diarrhea (yesterday) and nausea.   Neurological: Positive for dizziness and headaches.   Psychiatric/Behavioral: Positive for sleep disturbance.       Objective   Physical Exam   Constitutional: She appears well-developed and well-nourished.   HENT:   Head: Normocephalic and atraumatic.   Right Ear: External ear normal.   Left Ear: External ear normal.   Eyes: Conjunctivae are normal.   Cardiovascular: Normal rate, regular rhythm and normal heart sounds.  Exam reveals no gallop and no friction rub.    No murmur heard.  Pulmonary/Chest: Effort normal and breath sounds normal.   Abdominal: Soft. Bowel sounds are normal. There is tenderness in the right lower quadrant and left lower quadrant. There is no CVA tenderness.   Psychiatric: She has a normal mood and affect.   Vitals reviewed.      Assessment/Plan   Darcy was seen today for fatigue, nausea, cough and anxiety.    Diagnoses and all orders for this visit:    Fatigue, unspecified type  -     TSH  -     T4, Free  -      POC Urinalysis Dipstick, Automated  -     CBC (No Diff)    Elevated serum creatinine  -     Basic Metabolic Panel    Gastroesophageal reflux disease, esophagitis presence not specified  -     Helicobacter Pylori, IgA IgG IgM  -     promethazine (PHENERGAN) 25 MG tablet; Take 1 tablet by mouth Every 8 (Eight) Hours As Needed for Nausea or Vomiting.  -     CBC (No Diff)    Anxiety  -     hydrOXYzine (ATARAX) 50 MG tablet; Take 1 tablet by mouth 3 (Three) Times a Day As Needed for Itching.    Cerebrovascular accident (CVA), unspecified mechanism  -     aspirin 81 MG EC tablet; Take 1 tablet by mouth Daily.    Hot flashes  -     estradiol (ESTRACE) 0.5 MG tablet; Take 1 tablet by mouth Daily.    Essential hypertension  -     metoprolol succinate XL (TOPROL XL) 25 MG 24 hr tablet; Take 1 tablet by mouth Daily.    Urinary tract infection with hematuria, site unspecified  -     Urine Culture - Urine, Urine, Clean Catch      With hx of CVA, she needs to be on ASA, cut back on estradiol, quit smoking.  Cholesterol looked acceptable.  Discussed strategies for smoking cessation for 3 min.  She agrees to cut back to 5 cig per day.    Discussed possibly coming off wellbutrin as it can increase anxiety.

## 2017-10-11 DIAGNOSIS — A04.8 H. PYLORI INFECTION: Primary | ICD-10-CM

## 2017-10-11 LAB
BACTERIA UR CULT: NORMAL
BACTERIA UR CULT: NORMAL
BUN SERPL-MCNC: 24 MG/DL (ref 9–23)
BUN/CREAT SERPL: 16 (ref 7–25)
CALCIUM SERPL-MCNC: 10.3 MG/DL (ref 8.7–10.4)
CHLORIDE SERPL-SCNC: 113 MMOL/L (ref 99–109)
CO2 SERPL-SCNC: 23 MMOL/L (ref 20–31)
CREAT SERPL-MCNC: 1.5 MG/DL (ref 0.6–1.3)
ERYTHROCYTE [DISTWIDTH] IN BLOOD BY AUTOMATED COUNT: 13.9 % (ref 11.3–14.5)
GLUCOSE SERPL-MCNC: 117 MG/DL (ref 70–100)
H PYLORI IGA SER-ACNC: 13.2 UNITS (ref 0–8.9)
H PYLORI IGG SER IA-ACNC: >8 U/ML (ref 0–0.8)
H PYLORI IGM SER-ACNC: <9 UNITS (ref 0–8.9)
HCT VFR BLD AUTO: 52.2 % (ref 34.5–44)
HGB BLD-MCNC: 18.1 G/DL (ref 11.5–15.5)
MCH RBC QN AUTO: 35.8 PG (ref 27–31)
MCHC RBC AUTO-ENTMCNC: 34.7 G/DL (ref 32–36)
MCV RBC AUTO: 103.2 FL (ref 80–99)
PLATELET # BLD AUTO: 317 10*3/MM3 (ref 150–450)
POTASSIUM SERPL-SCNC: 3.6 MMOL/L (ref 3.5–5.5)
RBC # BLD AUTO: 5.06 10*6/MM3 (ref 3.89–5.14)
SODIUM SERPL-SCNC: 141 MMOL/L (ref 132–146)
T4 FREE SERPL-MCNC: 1.03 NG/DL (ref 0.89–1.76)
TSH SERPL DL<=0.005 MIU/L-ACNC: 1.84 MIU/ML (ref 0.35–5.35)
WBC # BLD AUTO: 8.37 10*3/MM3 (ref 3.5–10.8)

## 2017-10-11 RX ORDER — CLARITHROMYCIN 500 MG/1
500 TABLET, COATED ORAL 2 TIMES DAILY
Qty: 28 TABLET | Refills: 0 | Status: SHIPPED | OUTPATIENT
Start: 2017-10-11 | End: 2020-12-26

## 2017-10-11 RX ORDER — OMEPRAZOLE 20 MG/1
20 CAPSULE, DELAYED RELEASE ORAL 2 TIMES DAILY
Qty: 28 CAPSULE | Refills: 0 | Status: SHIPPED | OUTPATIENT
Start: 2017-10-11 | End: 2017-10-28 | Stop reason: SDUPTHER

## 2017-10-11 RX ORDER — AMOXICILLIN 500 MG/1
1000 TABLET, FILM COATED ORAL 2 TIMES DAILY
Qty: 56 TABLET | Refills: 0 | Status: SHIPPED | OUTPATIENT
Start: 2017-10-11 | End: 2020-12-26

## 2017-10-12 ENCOUNTER — TELEPHONE (OUTPATIENT)
Dept: INTERNAL MEDICINE | Facility: CLINIC | Age: 52
End: 2017-10-12

## 2017-10-12 NOTE — TELEPHONE ENCOUNTER
----- Message from Phoebe Dunbar sent at 10/11/2017  4:43 PM EDT -----  Patient called in regards to her lab work she had done a few days ago for her thyroid and a few other things. Patient has requested a call back when available to discuss her results.     Call back for patient: [phone]    Thank you.

## 2017-10-28 DIAGNOSIS — A04.8 H. PYLORI INFECTION: ICD-10-CM

## 2017-10-30 RX ORDER — OMEPRAZOLE 20 MG/1
CAPSULE, DELAYED RELEASE ORAL
Qty: 28 CAPSULE | Refills: 0 | Status: SHIPPED | OUTPATIENT
Start: 2017-10-30 | End: 2017-11-11 | Stop reason: SDUPTHER

## 2017-11-06 ENCOUNTER — TELEPHONE (OUTPATIENT)
Dept: INTERNAL MEDICINE | Facility: CLINIC | Age: 52
End: 2017-11-06

## 2017-11-06 NOTE — TELEPHONE ENCOUNTER
----- Message from Any Spaulding sent at 11/6/2017  2:48 PM EST -----  Contact: SELF  JAKI GRECO HAD LABS DONE ON  10/9/17 AND NEVER GOT THE RESULTS FOR THOSE.  SHE IS ALSO NEEDING A REFILL FOR CYMBALTA, SHE USES THE [facility]. SHE CAN BE REACHED -[phone]    I HAVE MAILED A LIST OF OTHER PROVIDERS FOR HER TO FIND ANOTHER PCP

## 2017-11-06 NOTE — TELEPHONE ENCOUNTER
Spoke with pt and reviewed labs again.  Pt states she never got the antibiotics.  Called pharmacy who states she did pick them up.  Called pt again who states she must have taken the antibiotics.  Advised to call and schedule with a new PCP.  Pt verbalized understanding.

## 2017-11-06 NOTE — TELEPHONE ENCOUNTER
# 60 of the Cymbalta with 2 refills was filled on 8/31/17.  She should have several weeks left.  If she has not found a new PCP when she gets near the end of that rx, I will refill.    Labs were normal with the exception of an elevated, but stable, creatinine level (kidney test).  Also, she had a positive H. Pylori bacteria, which was discussed with her by phone (see results note).  Please make sure she was treated for the H. Pylori.

## 2017-11-11 DIAGNOSIS — A04.8 H. PYLORI INFECTION: ICD-10-CM

## 2017-11-13 RX ORDER — OMEPRAZOLE 20 MG/1
CAPSULE, DELAYED RELEASE ORAL
Qty: 28 CAPSULE | Refills: 0 | Status: SHIPPED | OUTPATIENT
Start: 2017-11-13 | End: 2017-11-25 | Stop reason: SDUPTHER

## 2017-11-25 DIAGNOSIS — A04.8 H. PYLORI INFECTION: ICD-10-CM

## 2017-11-27 DIAGNOSIS — F41.9 ANXIETY: ICD-10-CM

## 2017-11-27 DIAGNOSIS — F32.A DEPRESSION, UNSPECIFIED DEPRESSION TYPE: ICD-10-CM

## 2017-11-27 DIAGNOSIS — I10 ESSENTIAL HYPERTENSION: ICD-10-CM

## 2017-11-27 RX ORDER — BUPROPION HYDROCHLORIDE 150 MG/1
150 TABLET ORAL DAILY
Qty: 30 TABLET | Refills: 0 | Status: SHIPPED | OUTPATIENT
Start: 2017-11-27 | End: 2020-12-26

## 2017-11-27 RX ORDER — HYDROCHLOROTHIAZIDE 25 MG/1
TABLET ORAL
Qty: 30 TABLET | Refills: 4 | OUTPATIENT
Start: 2017-11-27

## 2017-11-28 RX ORDER — OMEPRAZOLE 20 MG/1
20 CAPSULE, DELAYED RELEASE ORAL 2 TIMES DAILY
Qty: 60 CAPSULE | Refills: 0 | Status: SHIPPED | OUTPATIENT
Start: 2017-11-28 | End: 2020-12-26

## 2017-11-30 DIAGNOSIS — M79.2 NERVE PAIN: ICD-10-CM

## 2017-11-30 RX ORDER — DULOXETIN HYDROCHLORIDE 60 MG/1
60 CAPSULE, DELAYED RELEASE ORAL 2 TIMES DAILY
Qty: 60 CAPSULE | Refills: 0 | Status: SHIPPED | OUTPATIENT
Start: 2017-11-30

## 2017-12-08 ENCOUNTER — TELEPHONE (OUTPATIENT)
Dept: INTERNAL MEDICINE | Facility: CLINIC | Age: 52
End: 2017-12-08

## 2017-12-08 NOTE — TELEPHONE ENCOUNTER
----- Message from Rina Ewing sent at 12/8/2017  1:57 PM EST -----  [phone]    BETTY PT-NEEDS TO HAVE NERVE BLOCK PROCEDURE AT PAIN MANAGEMENT.  NEEDS AN OK TO STOP TAKING ASPIRIN FOR 5 DAYS.  SHE HAS NOT FOUND ANOTHER DOCTOR.   CAN YOU OK?  PLEASE CALL AND SHE CAN  IF YOU CAN.  WANTS TODAY IF POSSIBLE    SHE UNDERSTANDS SHE NEEDS TO FIND A NEW PCP AND LIST WAS MAILED TO HER

## 2017-12-08 NOTE — TELEPHONE ENCOUNTER
I called the patient, no answer, left message to call the clinic immediately to clarify the reason why she is on aspirin for medical clearance of temporarily discontinuing for procedure.    Again it should not be a major issue, but I am not familiar with her medical history.

## 2017-12-12 DIAGNOSIS — K21.9 GASTROESOPHAGEAL REFLUX DISEASE, ESOPHAGITIS PRESENCE NOT SPECIFIED: ICD-10-CM

## 2017-12-13 RX ORDER — PROMETHAZINE HYDROCHLORIDE 25 MG/1
TABLET ORAL
Qty: 30 TABLET | Refills: 1 | OUTPATIENT
Start: 2017-12-13

## 2017-12-27 DIAGNOSIS — M79.2 NERVE PAIN: ICD-10-CM

## 2017-12-27 DIAGNOSIS — F32.A DEPRESSION, UNSPECIFIED DEPRESSION TYPE: ICD-10-CM

## 2017-12-27 DIAGNOSIS — F41.9 ANXIETY: ICD-10-CM

## 2017-12-27 RX ORDER — BUPROPION HYDROCHLORIDE 150 MG/1
TABLET ORAL
Qty: 30 TABLET | Refills: 0 | OUTPATIENT
Start: 2017-12-27

## 2017-12-27 RX ORDER — DULOXETIN HYDROCHLORIDE 60 MG/1
CAPSULE, DELAYED RELEASE ORAL
Qty: 60 CAPSULE | Refills: 0 | OUTPATIENT
Start: 2017-12-27

## 2018-01-08 DIAGNOSIS — R23.2 HOT FLASHES: ICD-10-CM

## 2018-01-08 DIAGNOSIS — A04.8 H. PYLORI INFECTION: ICD-10-CM

## 2018-01-08 DIAGNOSIS — I10 ESSENTIAL HYPERTENSION: ICD-10-CM

## 2018-01-08 RX ORDER — METOPROLOL SUCCINATE 25 MG/1
TABLET, EXTENDED RELEASE ORAL
Qty: 30 TABLET | Refills: 1 | OUTPATIENT
Start: 2018-01-08

## 2018-01-08 RX ORDER — ESTRADIOL 0.5 MG/1
TABLET ORAL
Qty: 30 TABLET | Refills: 1 | OUTPATIENT
Start: 2018-01-08

## 2018-01-08 RX ORDER — OMEPRAZOLE 20 MG/1
CAPSULE, DELAYED RELEASE ORAL
Qty: 60 CAPSULE | Refills: 0 | OUTPATIENT
Start: 2018-01-08

## 2018-01-22 DIAGNOSIS — A04.8 H. PYLORI INFECTION: ICD-10-CM

## 2018-01-22 DIAGNOSIS — F41.9 ANXIETY: ICD-10-CM

## 2018-01-22 DIAGNOSIS — M79.2 NERVE PAIN: ICD-10-CM

## 2018-01-22 DIAGNOSIS — F32.A DEPRESSION, UNSPECIFIED DEPRESSION TYPE: ICD-10-CM

## 2018-01-22 DIAGNOSIS — R23.2 HOT FLASHES: ICD-10-CM

## 2018-01-22 DIAGNOSIS — I10 ESSENTIAL HYPERTENSION: ICD-10-CM

## 2018-01-22 RX ORDER — METOPROLOL SUCCINATE 25 MG/1
TABLET, EXTENDED RELEASE ORAL
Qty: 30 TABLET | Refills: 1 | OUTPATIENT
Start: 2018-01-22

## 2018-01-22 RX ORDER — OMEPRAZOLE 20 MG/1
CAPSULE, DELAYED RELEASE ORAL
Qty: 60 CAPSULE | Refills: 0 | OUTPATIENT
Start: 2018-01-22

## 2018-01-22 RX ORDER — ESTRADIOL 0.5 MG/1
TABLET ORAL
Qty: 30 TABLET | Refills: 1 | OUTPATIENT
Start: 2018-01-22

## 2018-01-22 RX ORDER — BUPROPION HYDROCHLORIDE 150 MG/1
TABLET ORAL
Qty: 30 TABLET | Refills: 0 | OUTPATIENT
Start: 2018-01-22

## 2018-01-22 RX ORDER — DULOXETIN HYDROCHLORIDE 60 MG/1
CAPSULE, DELAYED RELEASE ORAL
Qty: 60 CAPSULE | Refills: 0 | OUTPATIENT
Start: 2018-01-22

## 2018-02-06 DIAGNOSIS — R23.2 HOT FLASHES: ICD-10-CM

## 2018-02-06 RX ORDER — ESTRADIOL 0.5 MG/1
TABLET ORAL
Qty: 30 TABLET | Refills: 1 | OUTPATIENT
Start: 2018-02-06

## 2018-08-19 ENCOUNTER — APPOINTMENT (OUTPATIENT)
Dept: GENERAL RADIOLOGY | Facility: HOSPITAL | Age: 53
End: 2018-08-19

## 2018-08-19 ENCOUNTER — HOSPITAL ENCOUNTER (EMERGENCY)
Facility: HOSPITAL | Age: 53
Discharge: HOME | End: 2018-08-19
Attending: EMERGENCY MEDICINE | Admitting: EMERGENCY MEDICINE

## 2018-08-19 VITALS
RESPIRATION RATE: 18 BRPM | OXYGEN SATURATION: 96 % | HEART RATE: 86 BPM | HEIGHT: 66 IN | BODY MASS INDEX: 27.32 KG/M2 | DIASTOLIC BLOOD PRESSURE: 63 MMHG | WEIGHT: 170 LBS | TEMPERATURE: 98.6 F | SYSTOLIC BLOOD PRESSURE: 133 MMHG

## 2018-08-19 DIAGNOSIS — M25.561 ARTHRALGIA OF RIGHT KNEE: ICD-10-CM

## 2018-08-19 DIAGNOSIS — S80.01XA CONTUSION OF RIGHT KNEE, INITIAL ENCOUNTER: Primary | ICD-10-CM

## 2018-08-19 PROCEDURE — 99283 EMERGENCY DEPT VISIT LOW MDM: CPT

## 2018-08-19 PROCEDURE — 73560 X-RAY EXAM OF KNEE 1 OR 2: CPT

## 2018-08-19 RX ORDER — OXYCODONE AND ACETAMINOPHEN 10; 325 MG/1; MG/1
1 TABLET ORAL ONCE
Status: COMPLETED | OUTPATIENT
Start: 2018-08-19 | End: 2018-08-19

## 2018-08-19 RX ORDER — PREDNISONE 20 MG/1
20 TABLET ORAL 2 TIMES DAILY
Qty: 6 TABLET | Refills: 0 | Status: SHIPPED | OUTPATIENT
Start: 2018-08-19 | End: 2018-08-19

## 2018-08-19 RX ORDER — LIDOCAINE 50 MG/G
1 PATCH TOPICAL
Status: DISCONTINUED | OUTPATIENT
Start: 2018-08-19 | End: 2018-08-19 | Stop reason: HOSPADM

## 2018-08-19 RX ORDER — PREDNISONE 20 MG/1
20 TABLET ORAL 2 TIMES DAILY
Qty: 6 TABLET | Refills: 0 | Status: SHIPPED | OUTPATIENT
Start: 2018-08-19 | End: 2021-03-10 | Stop reason: SDUPTHER

## 2018-08-19 RX ORDER — LIDOCAINE 50 MG/G
1 PATCH TOPICAL EVERY 24 HOURS
Qty: 15 PATCH | Refills: 0 | Status: SHIPPED | OUTPATIENT
Start: 2018-08-19 | End: 2020-12-26

## 2018-08-19 RX ORDER — OXYCODONE HYDROCHLORIDE AND ACETAMINOPHEN 5; 325 MG/1; MG/1
1 TABLET ORAL EVERY 6 HOURS PRN
COMMUNITY
End: 2020-12-26

## 2018-08-19 RX ORDER — OXYCODONE AND ACETAMINOPHEN 10; 325 MG/1; MG/1
1 TABLET ORAL EVERY 6 HOURS PRN
Qty: 10 TABLET | Refills: 0 | Status: SHIPPED | OUTPATIENT
Start: 2018-08-19 | End: 2020-12-26

## 2018-08-19 RX ORDER — LIDOCAINE 50 MG/G
1 PATCH TOPICAL EVERY 24 HOURS
Qty: 15 PATCH | Refills: 0 | Status: SHIPPED | OUTPATIENT
Start: 2018-08-19 | End: 2018-08-19

## 2018-08-19 RX ADMIN — LIDOCAINE 1 PATCH: 50 PATCH CUTANEOUS at 19:44

## 2018-08-19 RX ADMIN — OXYCODONE HYDROCHLORIDE AND ACETAMINOPHEN 1 TABLET: 10; 325 TABLET ORAL at 19:19

## 2018-08-19 NOTE — ED PROVIDER NOTES
Subjective   Fell 3 weeks ago injuring right knee.  Tonight is having pain just under the kneecap radiating up into the thigh.  No paresis or paresthesias, no fevers chills or sweats.  No hip pain, no bowel or bladder dysfunction.        Illness   Location:  Per HPI  Quality:  Per HPI  Severity:  Moderate  Onset quality:  Sudden  Duration:  3 weeks  Timing:  Constant  Progression:  Waxing and waning  Chronicity:  New  Context:  Per HPI  Relieved by:  Per HPI  Worsened by:  Per HPI  Ineffective treatments:  Per HPI  Associated symptoms: no fever, no myalgias and no shortness of breath        Review of Systems   Constitutional: Negative for fever.   Respiratory: Negative for shortness of breath.    Musculoskeletal: Negative for myalgias.   All other systems reviewed and are negative.      Past Medical History:   Diagnosis Date   • Anxiety    • Asthma    • Bladder cancer (CMS/HCC)    • Cervical cancer (CMS/HCC)    • Chronic bronchitis (CMS/HCC)    • COPD (chronic obstructive pulmonary disease) (CMS/HCC)    • Depression    • Kidney disease        Allergies   Allergen Reactions   • Nsaids    • Toradol [Ketorolac Tromethamine] Nausea Only       Past Surgical History:   Procedure Laterality Date   • CYSTECTOMY     • HYSTERECTOMY         Family History   Problem Relation Age of Onset   • Colon cancer Mother    • Stroke Father    • Stroke Brother    • Heart attack Maternal Uncle    • Bone cancer Maternal Grandmother        Social History     Social History   • Marital status: Single     Social History Main Topics   • Smoking status: Current Every Day Smoker     Packs/day: 2.00   • Smokeless tobacco: Never Used   • Alcohol use No   • Drug use: No   • Sexual activity: Defer     Other Topics Concern   • Not on file           Objective   Physical Exam   Constitutional: She is oriented to person, place, and time. She appears well-developed and well-nourished. No distress.   HENT:   Head: Normocephalic and atraumatic.   Right Ear:  External ear normal.   Left Ear: External ear normal.   Nose: Nose normal.   Mouth/Throat: Oropharynx is clear and moist. No oropharyngeal exudate.   Eyes: Pupils are equal, round, and reactive to light. Conjunctivae and EOM are normal. Right eye exhibits no discharge. Left eye exhibits no discharge. No scleral icterus.   Neck: Normal range of motion. Neck supple. No JVD present. No tracheal deviation present. No thyromegaly present.   Cardiovascular: Normal rate.    Pulmonary/Chest: Effort normal. No stridor. No respiratory distress.   Abdominal: Soft. She exhibits no distension.   Musculoskeletal: Normal range of motion. She exhibits tenderness. She exhibits no edema or deformity.   Tenderness to the right knee diffusely with no obvious effusion warmth erythema or induration.  No popliteal tenderness, Homans sign is absent, neurovascular status is intact distally.  She is able to actively flex and extend the knee without difficulty.   Neurological: She is alert and oriented to person, place, and time. No cranial nerve deficit. She exhibits normal muscle tone. Coordination normal.   Skin: Skin is warm and dry. No rash noted. She is not diaphoretic. No erythema. No pallor.   Psychiatric: She has a normal mood and affect. Her behavior is normal. Judgment and thought content normal.   Nursing note and vitals reviewed.      Procedures           ED Course                  MDM      Final diagnoses:   Contusion of right knee, initial encounter   Arthralgia of right knee            Irineo Garcia PA-C  08/20/18 6699

## 2018-08-19 NOTE — DISCHARGE INSTRUCTIONS
You will be contacted by outpatient registration the next business day to schedule your MRI.  Results will be sent to Dr. Holly.  If you do not hear from registration by noon tomorrow, call [phone], asked to speak with registration, and schedule your MRI.  Call Dr. Holly office in the morning to schedule follow-up appointment for interpretation of your MRI.  Apply warm compresses every few hours for 20-30 minutes.  Follow-up with your pain management doctor for ongoing pain management.  Return to emergency department immediately if any change or worsening of symptoms.

## 2018-09-02 ENCOUNTER — HOSPITAL ENCOUNTER (EMERGENCY)
Facility: HOSPITAL | Age: 53
Discharge: HOME | End: 2018-09-02
Attending: EMERGENCY MEDICINE | Admitting: EMERGENCY MEDICINE

## 2018-09-02 VITALS
RESPIRATION RATE: 16 BRPM | SYSTOLIC BLOOD PRESSURE: 133 MMHG | HEART RATE: 80 BPM | DIASTOLIC BLOOD PRESSURE: 62 MMHG | BODY MASS INDEX: 28.32 KG/M2 | OXYGEN SATURATION: 97 % | HEIGHT: 65 IN | TEMPERATURE: 97.4 F | WEIGHT: 170 LBS

## 2018-09-02 DIAGNOSIS — M25.561 ACUTE PAIN OF RIGHT KNEE: Primary | ICD-10-CM

## 2018-09-02 DIAGNOSIS — M54.31 SCIATICA OF RIGHT SIDE: ICD-10-CM

## 2018-09-02 PROCEDURE — 96372 THER/PROPH/DIAG INJ SC/IM: CPT

## 2018-09-02 PROCEDURE — 99284 EMERGENCY DEPT VISIT MOD MDM: CPT

## 2018-09-02 PROCEDURE — 25010000002 METHYLPREDNISOLONE PER 125 MG: Performed by: EMERGENCY MEDICINE

## 2018-09-02 RX ORDER — CYCLOBENZAPRINE HCL 10 MG
10 TABLET ORAL ONCE
Status: COMPLETED | OUTPATIENT
Start: 2018-09-02 | End: 2018-09-02

## 2018-09-02 RX ORDER — PREDNISONE 50 MG/1
50 TABLET ORAL DAILY
Qty: 5 TABLET | Refills: 0 | Status: SHIPPED | OUTPATIENT
Start: 2018-09-02 | End: 2020-12-26

## 2018-09-02 RX ORDER — METHYLPREDNISOLONE SODIUM SUCCINATE 125 MG/2ML
125 INJECTION, POWDER, LYOPHILIZED, FOR SOLUTION INTRAMUSCULAR; INTRAVENOUS ONCE
Status: COMPLETED | OUTPATIENT
Start: 2018-09-02 | End: 2018-09-02

## 2018-09-02 RX ADMIN — METHYLPREDNISOLONE SODIUM SUCCINATE 125 MG: 125 INJECTION, POWDER, FOR SOLUTION INTRAMUSCULAR; INTRAVENOUS at 18:00

## 2018-09-02 RX ADMIN — CYCLOBENZAPRINE HYDROCHLORIDE 10 MG: 10 TABLET, FILM COATED ORAL at 17:59

## 2018-09-02 NOTE — ED PROVIDER NOTES
Subjective   Ms. Darcy Palomino is a 53 year old female who presents to the ED with c/o knee and upper leg pain. Patient has had pain in her right knee radiating into her low back since a fall last month. Was evaluated on 8/19 here for similar pain. An X-Ray at the time revealed no acute process. She has an MRI ordered but has not had the imaging performed. Presents today complaining of worsened pain in her right knee radiating into her upper leg and low back. Unsure what may have caused this sudden worsening. No new trauma/falls.        History provided by:  Patient  Knee Pain   Location:  Knee  Time since incident: 1.  Injury: yes    Mechanism of injury: fall    Fall:     Fall occurred: (see Irineo Garcia's note from 8/19)  Knee location:  R knee  Pain details:     Quality:  Shooting    Radiates to:  R leg and back    Severity:  Severe  Chronicity: pain for past month, worse today.  Dislocation: no    Foreign body present:  No foreign bodies  Relieved by:  Nothing  Associated symptoms: back pain    Associated symptoms: no fatigue, no fever, no itching, no muscle weakness, no neck pain, no numbness, no stiffness, no swelling and no tingling        Review of Systems   Constitutional: Negative for fatigue and fever.   Respiratory: Negative for cough and shortness of breath.    Cardiovascular: Negative for chest pain.   Gastrointestinal: Negative for abdominal pain, nausea and vomiting.   Musculoskeletal: Positive for arthralgias, back pain and myalgias. Negative for neck pain and stiffness.   Skin: Negative for itching.   All other systems reviewed and are negative.      Past Medical History:   Diagnosis Date   • Anxiety    • Asthma    • Bladder cancer (CMS/HCC)    • Cervical cancer (CMS/HCC)    • Chronic bronchitis (CMS/HCC)    • COPD (chronic obstructive pulmonary disease) (CMS/HCC)    • Depression    • Kidney disease        Allergies   Allergen Reactions   • Nsaids    • Toradol [Ketorolac Tromethamine] Nausea Only        Past Surgical History:   Procedure Laterality Date   • CYSTECTOMY     • HYSTERECTOMY         Family History   Problem Relation Age of Onset   • Colon cancer Mother    • Stroke Father    • Stroke Brother    • Heart attack Maternal Uncle    • Bone cancer Maternal Grandmother        Social History     Social History   • Marital status: Single     Social History Main Topics   • Smoking status: Current Every Day Smoker     Packs/day: 2.00   • Smokeless tobacco: Never Used   • Alcohol use No   • Drug use: No   • Sexual activity: Defer     Other Topics Concern   • Not on file         Objective   Physical Exam   Constitutional: She is oriented to person, place, and time. She appears well-developed and well-nourished. No distress.   HENT:   Head: Normocephalic and atraumatic.   Nose: Nose normal.   Eyes: Conjunctivae are normal. No scleral icterus.   Neck: Normal range of motion and phonation normal. Neck supple.   Pulmonary/Chest: Effort normal. No respiratory distress.   Musculoskeletal: She exhibits no edema.        Right knee: Tenderness found.   Tenderness to palpation over the right knee. No warmth. The knee is held in full flexion as position of comfort. Points to her back as an area of pain.  Tender right SI area. Nl rom at hip   Neurological: She is alert and oriented to person, place, and time.   Neurovascularly intact.   Skin: Skin is warm and dry. Capillary refill takes less than 2 seconds.   Psychiatric: She has a normal mood and affect. Her behavior is normal.   Nursing note and vitals reviewed.      Procedures         ED Course       No results found for this or any previous visit (from the past 24 hour(s)).  Note: In addition to lab results from this visit, the labs listed above may include labs taken at another facility or during a different encounter within the last 24 hours. Please correlate lab times with ED admission and discharge times for further clarification of the services performed during  "this visit.    No orders to display     Vitals:    09/02/18 1630   BP: 160/71   BP Location: Left arm   Patient Position: Sitting   Pulse: 97   Resp: 20   Temp: 97.4 °F (36.3 °C)   TempSrc: Oral   SpO2: 97%   Weight: 77.1 kg (170 lb)   Height: 165.1 cm (65\")     Medications   methylPREDNISolone sodium succinate (SOLU-Medrol) injection 125 mg (125 mg Intramuscular Given 9/2/18 1800)   cyclobenzaprine (FLEXERIL) tablet 10 mg (10 mg Oral Given 9/2/18 1759)     ECG/EMG Results (last 24 hours)     ** No results found for the last 24 hours. **        No results found for this or any previous visit (from the past 24 hour(s)).  Note: In addition to lab results from this visit, the labs listed above may include labs taken at another facility or during a different encounter within the last 24 hours. Please correlate lab times with ED admission and discharge times for further clarification of the services performed during this visit.    No orders to display     Vitals:    09/02/18 1630   BP: 160/71   BP Location: Left arm   Patient Position: Sitting   Pulse: 97   Resp: 20   Temp: 97.4 °F (36.3 °C)   TempSrc: Oral   SpO2: 97%   Weight: 77.1 kg (170 lb)   Height: 165.1 cm (65\")     Medications   methylPREDNISolone sodium succinate (SOLU-Medrol) injection 125 mg (125 mg Intramuscular Given 9/2/18 1800)   cyclobenzaprine (FLEXERIL) tablet 10 mg (10 mg Oral Given 9/2/18 1759)     ECG/EMG Results (last 24 hours)     ** No results found for the last 24 hours. **                    [facility]    Final diagnoses:   Acute pain of right knee   Sciatica of right side       Documentation assistance provided by jeanmarie Mcgrath.  Information recorded by the jeanmarie was done at my direction and has been verified and validated by me.     Maximino Mcgrath  09/02/18 8341       Tyrone Mendez PA  09/02/18 3999       Tyrone Mendez PA  09/02/18 8329    "

## 2018-09-02 NOTE — DISCHARGE INSTRUCTIONS
Follow-up with your primary care provider or orthopedist already assigned to you.  Return if loss of bowel bladder control, or other complaints.

## 2019-09-20 NOTE — TELEPHONE ENCOUNTER
----- Message from Aaron Montoya sent at 2/10/2017  3:53 PM EST -----  [phone] PT RETURNING PHONE CALL   fall/impaired judgment/safety awareness/cognitive impairment

## 2020-07-21 ENCOUNTER — TRANSCRIBE ORDERS (OUTPATIENT)
Dept: ADMINISTRATIVE | Facility: HOSPITAL | Age: 55
End: 2020-07-21

## 2020-07-21 DIAGNOSIS — N13.4: Primary | ICD-10-CM

## 2020-08-12 ENCOUNTER — OFFICE VISIT (OUTPATIENT)
Dept: ORTHOPEDIC SURGERY | Facility: CLINIC | Age: 55
End: 2020-08-12

## 2020-08-12 VITALS — OXYGEN SATURATION: 97 % | HEART RATE: 80 BPM | BODY MASS INDEX: 25.66 KG/M2 | HEIGHT: 65 IN | WEIGHT: 154 LBS

## 2020-08-12 DIAGNOSIS — F11.20 UNCOMPLICATED OPIOID DEPENDENCE (HCC): ICD-10-CM

## 2020-08-12 DIAGNOSIS — M87.052 AVASCULAR NECROSIS OF BONES OF BOTH HIPS (HCC): ICD-10-CM

## 2020-08-12 DIAGNOSIS — Z85.41 HISTORY OF CERVICAL CANCER: ICD-10-CM

## 2020-08-12 DIAGNOSIS — M25.551 RIGHT HIP PAIN: Primary | ICD-10-CM

## 2020-08-12 DIAGNOSIS — Z85.51 HX OF BLADDER CANCER: ICD-10-CM

## 2020-08-12 DIAGNOSIS — M87.051 AVASCULAR NECROSIS OF BONES OF BOTH HIPS (HCC): ICD-10-CM

## 2020-08-12 PROCEDURE — 99204 OFFICE O/P NEW MOD 45 MIN: CPT | Performed by: ORTHOPAEDIC SURGERY

## 2020-08-12 NOTE — PROGRESS NOTES
[facility]    Subjective     Chief Complaint   Patient presents with   • Right Hip - Pain        HPI  Darcy Palomino is a 55 y.o. female who presents with right hip pain.  Onset: atraumatic and gradual in nature. The issue has been ongoing for 2 month(s). Pain is a 9/10 on the pain scale. Pain is described as throbbing. Associated symptoms include pain, popping and giving way/buckling. The pain is worse with sitting; pain medication and/or NSAID improve the pain. Previous treatments have included: NSAIDS.    I have reviewed the following portions of the patient's history:History of Present Illness    She had bladder cancer surgery in 2017.  She has cervical cancer surgery 1991.  She is on Percocet 15 mg daily.  She goes to pain clinic here in town.  She smokes half pack a day.        Past Medical History:   Diagnosis Date   • Anxiety    • Asthma    • Bladder cancer (CMS/HCC)    • Cervical cancer (CMS/HCC)    • Chronic bronchitis (CMS/HCC)    • COPD (chronic obstructive pulmonary disease) (CMS/HCC)    • Depression    • Kidney disease       Past Surgical History:   Procedure Laterality Date   • BLADDER SURGERY     • CYSTECTOMY     • HYSTERECTOMY        Family History   Problem Relation Age of Onset   • Colon cancer Mother    • Stroke Father    • Stroke Brother    • Heart attack Maternal Uncle    • Bone cancer Maternal Grandmother      Social History     Socioeconomic History   • Marital status: Single     Spouse name: Not on file   • Number of children: Not on file   • Years of education: Not on file   • Highest education level: Not on file   Tobacco Use   • Smoking status: Current Every Day Smoker     Packs/day: 2.00   • Smokeless tobacco: Never Used   Substance and Sexual Activity   • Alcohol use: No   • Drug use: No   • Sexual activity: Defer      Current Outpatient Medications on File Prior to Visit   Medication Sig Dispense Refill   • albuterol (PROVENTIL) (2.5 MG/3ML) 0.083%  nebulizer solution Take 2.5 mg by nebulization Every 6 (Six) Hours As Needed for wheezing or shortness of air. 75 mL 2   • amoxicillin (AMOXIL) 500 MG tablet Take 2 tablets by mouth 2 (Two) Times a Day. 56 tablet 0   • aspirin 81 MG EC tablet Take 1 tablet by mouth Daily. 90 tablet 2   • buPROPion XL (WELLBUTRIN XL) 150 MG 24 hr tablet Take 1 tablet by mouth Daily. 30 tablet 0   • clarithromycin (BIAXIN) 500 MG tablet Take 1 tablet by mouth 2 (Two) Times a Day. 28 tablet 0   • COMBIVENT RESPIMAT  MCG/ACT inhaler Inhale 1 puff 4 (Four) Times a Day. 4 g 5   • DULoxetine (CYMBALTA) 60 MG capsule Take 1 capsule by mouth 2 (Two) Times a Day. 60 capsule 0   • estradiol (ESTRACE) 0.5 MG tablet Take 1 tablet by mouth Daily. 30 tablet 2   • gabapentin (NEURONTIN) 800 MG tablet TAKE ONE TABLET BY MOUTH THREE TIMES A DAY 90 tablet 3   • hydrochlorothiazide (HYDRODIURIL) 25 MG tablet Take 1 tablet by mouth Daily. 30 tablet 5   • hydrOXYzine (ATARAX) 50 MG tablet Take 1 tablet by mouth 3 (Three) Times a Day As Needed for Itching. 30 tablet 1   • lidocaine (LIDODERM) 5 % Place 1 patch on the skin as directed by provider Daily. Remove & Discard patch within 12 hours or as directed by MD 15 patch 0   • metoprolol succinate XL (TOPROL XL) 25 MG 24 hr tablet Take 1 tablet by mouth Daily. 30 tablet 2   • mupirocin (BACTROBAN) 2 % ointment Apply  topically 2 (Two) Times a Day. 15 g 2   • omeprazole (priLOSEC) 20 MG capsule Take 1 capsule by mouth 2 (Two) Times a Day. 60 capsule 0   • oxyCODONE-acetaminophen (PERCOCET)  MG per tablet Take 1 tablet by mouth Every 6 (Six) Hours As Needed for moderate pain (4-6). 120 tablet 0   • oxyCODONE-acetaminophen (PERCOCET)  MG per tablet Take 1 tablet by mouth Every 6 (Six) Hours As Needed for Moderate Pain . 10 tablet 0   • oxyCODONE-acetaminophen (PERCOCET) 5-325 MG per tablet Take 1 tablet by mouth Every 6 (Six) Hours As Needed.     • potassium chloride (K-DUR,KLOR-CON) 10 MEQ  "CR tablet Take 10 mEq by mouth Daily.     • predniSONE (DELTASONE) 20 MG tablet Take 1 tablet by mouth 2 (Two) Times a Day. 6 tablet 0   • predniSONE (DELTASONE) 50 MG tablet Take 1 tablet by mouth Daily. 5 tablet 0   • promethazine (PHENERGAN) 25 MG tablet Take 1 tablet by mouth Every 8 (Eight) Hours As Needed for Nausea or Vomiting. 30 tablet 2   • tiZANidine (ZANAFLEX) 4 MG tablet TAKE 1 TABLET BY MOUTH EVERY 8 HOURS 90 tablet 2   • VENTOLIN  (90 BASE) MCG/ACT inhaler Inhale 2 puffs Every 6 (Six) Hours As Needed for Wheezing. 6.7 g 3     No current facility-administered medications on file prior to visit.       Allergies   Allergen Reactions   • Nsaids    • Toradol [Ketorolac Tromethamine] Nausea Only        The following portions of the patient's history were reviewed and updated as appropriate: allergies, current medications, past family history, past medical history, past social history, past surgical history and problem list.    Review of Systems   Constitutional: Negative.    HENT: Negative.    Eyes: Negative.    Respiratory: Negative.    Cardiovascular: Negative.    Gastrointestinal: Negative.    Endocrine: Negative.    Genitourinary: Negative.    Musculoskeletal: Positive for arthralgias and gait problem.   Skin: Negative.    Allergic/Immunologic: Negative.    Hematological: Negative.    Psychiatric/Behavioral: Negative.         Objective      Physical Exam  Pulse 80   Ht 165.1 cm (65\")   Wt 69.9 kg (154 lb)   SpO2 97%   BMI 25.63 kg/m²     Body mass index is 25.63 kg/m².    GENERAL APPEARANCE: awake, alert & oriented x 3, in no acute distress and well developed, well nourished  PSYCH: normal mood and affect  LUNGS:  breathing nonlabored, no wheezing  EYES: sclera anicteric, pupils equal  CARDIOVASCULAR: palpable pulses dorsalis pedis, palpable posterior tibial bilaterally. Capillary refill less than 2 seconds  INTEGUMENTARY: skin intact, no clubbing, cyanosis  NEUROLOGIC:  Normal Sensation and " reflexes       Ortho Exam  Peripheral Vascular  Lower Extremity   Edema - None bilaterally    Musculoskeletal  Lower Extremity   Bilateral hips    No instability, subluxation or laxity    No known fractures or dislocations    Normal Sensation and coordination   Inspection and palpation    Tenderness - none    Tissue tension/texture is pliable and soft    Normal warmth   Strength and Tone    Left hip flexors - 4/5    Range of Motion    Internal Rotation: PROM - 60    External Rotation: PROM - 60   Deformities/Postures/Misalignments/Discrepancies    No leg length discrepancy   Functional Testing    Stinchfield test positive    90-90 straight leg raise negative          Imaging/Studies  Imaging Results (Last 7 Days)     Procedure Component Value Units Date/Time    XR Hip With or Without Pelvis 2 - 3 View Right [[id number]] Resulted:  08/12/20 1416     Updated:  08/12/20 1417    Narrative:       Right Hip X-Ray  Indication: Pain  AP pelvis and Frog Leg views    Findings: Madison sign consistent with AVN as well as cam and pincer   impingement lesions  No fracture  Normal joint spaces    No prior studies were available for comparison.            I viewed her MRI from July 16 which shows avascular necrosis in both femoral heads  Assessment/Plan        ICD-10-CM ICD-9-CM   1. Right hip pain M25.551 719.45   2. Avascular necrosis of bones of both hips (CMS/[facility]) M87.051 733.42    M87.052    3. History of cervical cancer Z85.41 V10.41   4. Hx of bladder cancer Z85.51 V10.51   5. Uncomplicated opioid dependence (CMS/HCC) F11.20 304.00       Orders Placed This Encounter   Procedures   • XR Hip With or Without Pelvis 2 - 3 View Right   • FL Guide For Pain Meds Inj   • FL Injection Hip Left      I have ordered a walker with a seat.  I have ordered hip cortisone injections for each hip.  I will see her back in 6 weeks.  She will continue pain management.  I encouraged her to stop smoking.  Her prognosis is poor.  There is no cure  for avascular necrosis.  Her hips will continue on to collapse and eventually need hip replacement.  She is young for that.  Her pain medicine requirements make her a poor operative candidate as far as postoperative pain control.  Medical Decision Making   Management Options : prescription/IM medicine  Data/Risk: radiology tests and independent visualization of imaging, lab tests, or EMG/NCV    Sami Dunbar MD  08/12/20  14:26         EMR Dragon/Transcription disclaimer:  Much of this encounter note is an electronic transcription of spoken language to printed text. Electronic transcription of spoken language may permit erroneous, or at times, nonsensical words or phrases to be inadvertently transcribed. Although I have reviewed the note for such errors, some may still exist.

## 2020-08-13 DIAGNOSIS — M87.052 AVASCULAR NECROSIS OF BONES OF BOTH HIPS (HCC): Primary | ICD-10-CM

## 2020-08-13 DIAGNOSIS — M87.051 AVASCULAR NECROSIS OF BONES OF BOTH HIPS (HCC): Primary | ICD-10-CM

## 2020-08-17 ENCOUNTER — TRANSCRIBE ORDERS (OUTPATIENT)
Dept: ADMINISTRATIVE | Facility: HOSPITAL | Age: 55
End: 2020-08-17

## 2020-08-17 DIAGNOSIS — M87.051 AVASCULAR NECROSIS OF BONES OF BOTH HIPS (HCC): Primary | ICD-10-CM

## 2020-08-17 DIAGNOSIS — M87.052 AVASCULAR NECROSIS OF BONES OF BOTH HIPS (HCC): Primary | ICD-10-CM

## 2020-08-21 ENCOUNTER — HOSPITAL ENCOUNTER (OUTPATIENT)
Dept: GENERAL RADIOLOGY | Facility: HOSPITAL | Age: 55
Discharge: HOME | End: 2020-08-21

## 2020-08-21 ENCOUNTER — APPOINTMENT (OUTPATIENT)
Dept: GENERAL RADIOLOGY | Facility: HOSPITAL | Age: 55
End: 2020-08-21

## 2020-08-24 ENCOUNTER — APPOINTMENT (OUTPATIENT)
Dept: GENERAL RADIOLOGY | Facility: HOSPITAL | Age: 55
End: 2020-08-24

## 2020-09-23 ENCOUNTER — OFFICE VISIT (OUTPATIENT)
Dept: ORTHOPEDIC SURGERY | Facility: CLINIC | Age: 55
End: 2020-09-23

## 2020-09-23 VITALS — OXYGEN SATURATION: 98 % | BODY MASS INDEX: 23.82 KG/M2 | HEIGHT: 65 IN | HEART RATE: 88 BPM | WEIGHT: 143 LBS

## 2020-09-23 DIAGNOSIS — M25.551 RIGHT HIP PAIN: ICD-10-CM

## 2020-09-23 DIAGNOSIS — F11.20 UNCOMPLICATED OPIOID DEPENDENCE (HCC): ICD-10-CM

## 2020-09-23 DIAGNOSIS — M87.051 AVASCULAR NECROSIS OF BONES OF BOTH HIPS (HCC): Primary | ICD-10-CM

## 2020-09-23 DIAGNOSIS — M87.052 AVASCULAR NECROSIS OF BONES OF BOTH HIPS (HCC): Primary | ICD-10-CM

## 2020-09-23 PROCEDURE — 99213 OFFICE O/P EST LOW 20 MIN: CPT | Performed by: ORTHOPAEDIC SURGERY

## 2020-09-23 RX ORDER — HYDROXYZINE PAMOATE 25 MG/1
CAPSULE ORAL
COMMUNITY
End: 2020-12-26

## 2020-09-23 NOTE — PROGRESS NOTES
[facility] Note    Subjective     Chief Complaint   Patient presents with   • Right Hip - Follow-up     6 week         HPI  Darcy Palomino is a 55 y.o. female.  She never got her hip injection.  I will reorder that.  Pain is 10 out of 10.  She is in pain management.  She has Percocet tens.  With her history of cancer got the MRI to rule that out.  Her MRI shows she has avascular necrosis in both hips.  She is a poor operative candidate.    Past Medical History:   Diagnosis Date   • Anxiety    • Asthma    • Bladder cancer (CMS/HCC)    • Cervical cancer (CMS/HCC)    • Chronic bronchitis (CMS/HCC)    • COPD (chronic obstructive pulmonary disease) (CMS/HCC)    • Depression    • Kidney disease       Past Surgical History:   Procedure Laterality Date   • BLADDER SURGERY     • CYSTECTOMY     • HYSTERECTOMY        Family History   Problem Relation Age of Onset   • Colon cancer Mother    • Stroke Father    • Stroke Brother    • Heart attack Maternal Uncle    • Bone cancer Maternal Grandmother      Social History     Socioeconomic History   • Marital status: Single     Spouse name: Not on file   • Number of children: Not on file   • Years of education: Not on file   • Highest education level: Not on file   Tobacco Use   • Smoking status: Current Every Day Smoker     Packs/day: 2.00   • Smokeless tobacco: Never Used   Substance and Sexual Activity   • Alcohol use: No   • Drug use: No   • Sexual activity: Defer      Current Outpatient Medications on File Prior to Visit   Medication Sig Dispense Refill   • albuterol (PROVENTIL) (2.5 MG/3ML) 0.083% nebulizer solution Take 2.5 mg by nebulization Every 6 (Six) Hours As Needed for wheezing or shortness of air. 75 mL 2   • amoxicillin (AMOXIL) 500 MG tablet Take 2 tablets by mouth 2 (Two) Times a Day. 56 tablet 0   • aspirin 81 MG EC tablet Take 1 tablet by mouth Daily. 90 tablet 2   • buPROPion XL (WELLBUTRIN XL) 150 MG 24 hr tablet Take 1 tablet by mouth  Daily. 30 tablet 0   • clarithromycin (BIAXIN) 500 MG tablet Take 1 tablet by mouth 2 (Two) Times a Day. 28 tablet 0   • COMBIVENT RESPIMAT  MCG/ACT inhaler Inhale 1 puff 4 (Four) Times a Day. 4 g 5   • DULoxetine (CYMBALTA) 60 MG capsule Take 1 capsule by mouth 2 (Two) Times a Day. 60 capsule 0   • estradiol (ESTRACE) 0.5 MG tablet Take 1 tablet by mouth Daily. 30 tablet 2   • gabapentin (NEURONTIN) 800 MG tablet TAKE ONE TABLET BY MOUTH THREE TIMES A DAY 90 tablet 3   • hydrochlorothiazide (HYDRODIURIL) 25 MG tablet Take 1 tablet by mouth Daily. 30 tablet 5   • hydrOXYzine (ATARAX) 50 MG tablet Take 1 tablet by mouth 3 (Three) Times a Day As Needed for Itching. 30 tablet 1   • hydrOXYzine pamoate (VISTARIL) 25 MG capsule hydroxyzine pamoate 25 mg capsule     • lidocaine (LIDODERM) 5 % Place 1 patch on the skin as directed by provider Daily. Remove & Discard patch within 12 hours or as directed by MD 15 patch 0   • metoprolol succinate XL (TOPROL XL) 25 MG 24 hr tablet Take 1 tablet by mouth Daily. 30 tablet 2   • mupirocin (BACTROBAN) 2 % ointment Apply  topically 2 (Two) Times a Day. 15 g 2   • omeprazole (priLOSEC) 20 MG capsule Take 1 capsule by mouth 2 (Two) Times a Day. 60 capsule 0   • oxyCODONE-acetaminophen (PERCOCET)  MG per tablet Take 1 tablet by mouth Every 6 (Six) Hours As Needed for moderate pain (4-6). 120 tablet 0   • oxyCODONE-acetaminophen (PERCOCET)  MG per tablet Take 1 tablet by mouth Every 6 (Six) Hours As Needed for Moderate Pain . 10 tablet 0   • oxyCODONE-acetaminophen (PERCOCET) 5-325 MG per tablet Take 1 tablet by mouth Every 6 (Six) Hours As Needed.     • potassium chloride (K-DUR,KLOR-CON) 10 MEQ CR tablet Take 10 mEq by mouth Daily.     • predniSONE (DELTASONE) 20 MG tablet Take 1 tablet by mouth 2 (Two) Times a Day. 6 tablet 0   • predniSONE (DELTASONE) 50 MG tablet Take 1 tablet by mouth Daily. 5 tablet 0   • promethazine (PHENERGAN) 25 MG tablet Take 1 tablet by  "mouth Every 8 (Eight) Hours As Needed for Nausea or Vomiting. 30 tablet 2   • tiZANidine (ZANAFLEX) 4 MG tablet TAKE 1 TABLET BY MOUTH EVERY 8 HOURS 90 tablet 2   • VENTOLIN  (90 BASE) MCG/ACT inhaler Inhale 2 puffs Every 6 (Six) Hours As Needed for Wheezing. 6.7 g 3     No current facility-administered medications on file prior to visit.       Allergies   Allergen Reactions   • Nsaids    • Toradol [Ketorolac Tromethamine] Nausea Only        The following portions of the patient's history were reviewed and updated as appropriate: allergies, current medications, past family history, past medical history, past social history, past surgical history and problem list.    Review of Systems   Constitutional: Negative.    HENT: Negative.    Eyes: Negative.    Respiratory: Negative.    Cardiovascular: Negative.    Gastrointestinal: Negative.    Endocrine: Negative.    Genitourinary: Negative.    Musculoskeletal: Positive for arthralgias.   Skin: Negative.    Allergic/Immunologic: Negative.    Neurological: Positive for numbness.   Hematological: Negative.    Psychiatric/Behavioral: The patient is nervous/anxious.         Objective      Physical Exam  Pulse 88   Ht 165.1 cm (65\")   Wt 64.9 kg (143 lb)   SpO2 98%   BMI 23.80 kg/m²     Body mass index is 23.8 kg/m².    GENERAL APPEARANCE: awake, alert & oriented x 3, in no acute distress and well developed, well nourished  PSYCH: normal mood and affect  LUNGS:  breathing nonlabored, no wheezing  No change in hip exam.  She walks with a walker.  She has pain with right hip range of motion  Imaging/Studies  Imaging Results (Last 7 Days)     ** No results found for the last 168 hours. **          Assessment/Plan        ICD-10-CM ICD-9-CM   1. Avascular necrosis of bones of both hips (CMS/[facility])  M87.051 733.42    M87.052    2. Right hip pain  M25.551 719.45   3. Uncomplicated opioid dependence (CMS/[facility])  F11.20 304.00       Orders Placed This Encounter   Procedures   • [place] " Guide For Pain Meds Inj      I have ordered a right hip steroid injection.  I will see her back in 6 weeks.  She is not a good surgical candidate based upon her opioid dependence.  Disability.  Her noncompliance.  Medical Decision Making  Management Options : prescription/IM medicine      Sami Dunbar MD  09/23/20  13:07 EDT         EMR Dragon/Transcription disclaimer:  Much of this encounter note is an electronic transcription of spoken language to printed text. Electronic transcription of spoken language may permit erroneous, or at times, nonsensical words or phrases to be inadvertently transcribed. Although I have reviewed the note for such errors, some may still exist.

## 2020-09-28 ENCOUNTER — HOSPITAL ENCOUNTER (OUTPATIENT)
Dept: GENERAL RADIOLOGY | Facility: HOSPITAL | Age: 55
Discharge: HOME | End: 2020-09-28
Admitting: ORTHOPAEDIC SURGERY

## 2020-09-28 DIAGNOSIS — M87.051 AVASCULAR NECROSIS OF BONES OF BOTH HIPS (HCC): ICD-10-CM

## 2020-09-28 DIAGNOSIS — M87.052 AVASCULAR NECROSIS OF BONES OF BOTH HIPS (HCC): ICD-10-CM

## 2020-09-28 PROCEDURE — 25010000003 LIDOCAINE 1 % SOLUTION: Performed by: ORTHOPAEDIC SURGERY

## 2020-09-28 PROCEDURE — 25010000002 BETAMETHASONE ACET & SOD PHOS PER 4 MG: Performed by: ORTHOPAEDIC SURGERY

## 2020-09-28 PROCEDURE — 77002 NEEDLE LOCALIZATION BY XRAY: CPT

## 2020-09-28 RX ORDER — BUPIVACAINE HYDROCHLORIDE 2.5 MG/ML
4 INJECTION, SOLUTION INFILTRATION; PERINEURAL ONCE
Status: DISCONTINUED | OUTPATIENT
Start: 2020-09-28 | End: 2020-09-28

## 2020-09-28 RX ORDER — LIDOCAINE HYDROCHLORIDE 10 MG/ML
4 INJECTION, SOLUTION INFILTRATION; PERINEURAL ONCE
Status: DISCONTINUED | OUTPATIENT
Start: 2020-09-28 | End: 2020-09-28

## 2020-09-28 RX ORDER — BETAMETHASONE SODIUM PHOSPHATE AND BETAMETHASONE ACETATE 3; 3 MG/ML; MG/ML
6 INJECTION, SUSPENSION INTRA-ARTICULAR; INTRALESIONAL; INTRAMUSCULAR; SOFT TISSUE ONCE
Status: COMPLETED | OUTPATIENT
Start: 2020-09-28 | End: 2020-09-28

## 2020-09-28 RX ORDER — BUPIVACAINE HYDROCHLORIDE 2.5 MG/ML
4 INJECTION, SOLUTION EPIDURAL; INFILTRATION; INTRACAUDAL ONCE
Status: COMPLETED | OUTPATIENT
Start: 2020-09-28 | End: 2020-09-28

## 2020-09-28 RX ORDER — LIDOCAINE HYDROCHLORIDE 10 MG/ML
10 INJECTION, SOLUTION EPIDURAL; INFILTRATION; INTRACAUDAL; PERINEURAL ONCE
Status: COMPLETED | OUTPATIENT
Start: 2020-09-28 | End: 2020-09-28

## 2020-09-28 RX ORDER — LIDOCAINE HYDROCHLORIDE 10 MG/ML
4 INJECTION, SOLUTION EPIDURAL; INFILTRATION; INTRACAUDAL; PERINEURAL ONCE
Status: DISCONTINUED | OUTPATIENT
Start: 2020-09-28 | End: 2020-09-29 | Stop reason: HOSPADM

## 2020-09-28 RX ADMIN — LIDOCAINE HYDROCHLORIDE 10 ML: 10 INJECTION, SOLUTION EPIDURAL; INFILTRATION; INTRACAUDAL; PERINEURAL at 14:17

## 2020-09-28 RX ADMIN — BUPIVACAINE HYDROCHLORIDE 4 ML: 2.5 INJECTION, SOLUTION EPIDURAL; INFILTRATION; INTRACAUDAL; PERINEURAL at 14:17

## 2020-09-28 RX ADMIN — BETAMETHASONE SODIUM PHOSPHATE AND BETAMETHASONE ACETATE 6 MG: 3; 3 INJECTION, SUSPENSION INTRA-ARTICULAR; INTRALESIONAL; INTRAMUSCULAR at 14:16

## 2020-09-28 RX ADMIN — LIDOCAINE HYDROCHLORIDE 4 ML: 10 INJECTION, SOLUTION INFILTRATION; PERINEURAL at 14:17

## 2020-11-04 ENCOUNTER — OFFICE VISIT (OUTPATIENT)
Dept: ORTHOPEDIC SURGERY | Facility: CLINIC | Age: 55
End: 2020-11-04

## 2020-11-04 VITALS — BODY MASS INDEX: 23.86 KG/M2 | WEIGHT: 143.2 LBS | HEART RATE: 91 BPM | HEIGHT: 65 IN | OXYGEN SATURATION: 99 %

## 2020-11-04 DIAGNOSIS — M87.052 AVASCULAR NECROSIS OF BONES OF BOTH HIPS (HCC): Primary | ICD-10-CM

## 2020-11-04 DIAGNOSIS — M25.551 RIGHT HIP PAIN: ICD-10-CM

## 2020-11-04 DIAGNOSIS — F11.20 UNCOMPLICATED OPIOID DEPENDENCE (HCC): ICD-10-CM

## 2020-11-04 DIAGNOSIS — M87.051 AVASCULAR NECROSIS OF BONES OF BOTH HIPS (HCC): Primary | ICD-10-CM

## 2020-11-04 PROCEDURE — 99212 OFFICE O/P EST SF 10 MIN: CPT | Performed by: ORTHOPAEDIC SURGERY

## 2020-11-04 NOTE — PROGRESS NOTES
[facility] Note    Subjective     Chief Complaint   Patient presents with   • Follow-up     6 weeks follow up for Avascular necrosis of bones of both hips        HPI  Darcy Palomino is a 55 y.o. female.  She says the right hip injection she received did not help but for a few hours.  Her pain management doctor went out of business.  She went to [facility] and spine here in [place].  She is on Percocet 15's for hip and back pain.  Her pain is 9 out of 10.  She walks with a walker.    Past Medical History:   Diagnosis Date   • Anxiety    • Asthma    • Bladder cancer (CMS/HCC)    • Cervical cancer (CMS/HCC)    • Chronic bronchitis (CMS/HCC)    • COPD (chronic obstructive pulmonary disease) (CMS/HCC)    • Depression    • Kidney disease       Past Surgical History:   Procedure Laterality Date   • BLADDER SURGERY     • CYSTECTOMY     • HYSTERECTOMY        Family History   Problem Relation Age of Onset   • Colon cancer Mother    • Stroke Father    • Stroke Brother    • Heart attack Maternal Uncle    • Bone cancer Maternal Grandmother      Social History     Socioeconomic History   • Marital status: Single     Spouse name: Not on file   • Number of children: Not on file   • Years of education: Not on file   • Highest education level: Not on file   Tobacco Use   • Smoking status: Current Every Day Smoker     Packs/day: 2.00   • Smokeless tobacco: Never Used   Substance and Sexual Activity   • Alcohol use: No   • Drug use: No   • Sexual activity: Defer      Current Outpatient Medications on File Prior to Visit   Medication Sig Dispense Refill   • albuterol (PROVENTIL) (2.5 MG/3ML) 0.083% nebulizer solution Take 2.5 mg by nebulization Every 6 (Six) Hours As Needed for wheezing or shortness of air. 75 mL 2   • amoxicillin (AMOXIL) 500 MG tablet Take 2 tablets by mouth 2 (Two) Times a Day. 56 tablet 0   • aspirin 81 MG EC tablet Take 1 tablet by mouth Daily. 90 tablet 2   • buPROPion XL (WELLBUTRIN  XL) 150 MG 24 hr tablet Take 1 tablet by mouth Daily. 30 tablet 0   • clarithromycin (BIAXIN) 500 MG tablet Take 1 tablet by mouth 2 (Two) Times a Day. 28 tablet 0   • COMBIVENT RESPIMAT  MCG/ACT inhaler Inhale 1 puff 4 (Four) Times a Day. 4 g 5   • DULoxetine (CYMBALTA) 60 MG capsule Take 1 capsule by mouth 2 (Two) Times a Day. 60 capsule 0   • estradiol (ESTRACE) 0.5 MG tablet Take 1 tablet by mouth Daily. 30 tablet 2   • gabapentin (NEURONTIN) 800 MG tablet TAKE ONE TABLET BY MOUTH THREE TIMES A DAY 90 tablet 3   • hydrochlorothiazide (HYDRODIURIL) 25 MG tablet Take 1 tablet by mouth Daily. 30 tablet 5   • hydrOXYzine (ATARAX) 50 MG tablet Take 1 tablet by mouth 3 (Three) Times a Day As Needed for Itching. 30 tablet 1   • hydrOXYzine pamoate (VISTARIL) 25 MG capsule hydroxyzine pamoate 25 mg capsule     • lidocaine (LIDODERM) 5 % Place 1 patch on the skin as directed by provider Daily. Remove & Discard patch within 12 hours or as directed by MD 15 patch 0   • metoprolol succinate XL (TOPROL XL) 25 MG 24 hr tablet Take 1 tablet by mouth Daily. 30 tablet 2   • mupirocin (BACTROBAN) 2 % ointment Apply  topically 2 (Two) Times a Day. 15 g 2   • omeprazole (priLOSEC) 20 MG capsule Take 1 capsule by mouth 2 (Two) Times a Day. 60 capsule 0   • oxyCODONE-acetaminophen (PERCOCET)  MG per tablet Take 1 tablet by mouth Every 6 (Six) Hours As Needed for moderate pain (4-6). 120 tablet 0   • oxyCODONE-acetaminophen (PERCOCET)  MG per tablet Take 1 tablet by mouth Every 6 (Six) Hours As Needed for Moderate Pain . 10 tablet 0   • oxyCODONE-acetaminophen (PERCOCET) 5-325 MG per tablet Take 1 tablet by mouth Every 6 (Six) Hours As Needed.     • potassium chloride (K-DUR,KLOR-CON) 10 MEQ CR tablet Take 10 mEq by mouth Daily.     • predniSONE (DELTASONE) 20 MG tablet Take 1 tablet by mouth 2 (Two) Times a Day. 6 tablet 0   • predniSONE (DELTASONE) 50 MG tablet Take 1 tablet by mouth Daily. 5 tablet 0   •  "promethazine (PHENERGAN) 25 MG tablet Take 1 tablet by mouth Every 8 (Eight) Hours As Needed for Nausea or Vomiting. 30 tablet 2   • tiZANidine (ZANAFLEX) 4 MG tablet TAKE 1 TABLET BY MOUTH EVERY 8 HOURS 90 tablet 2   • VENTOLIN  (90 BASE) MCG/ACT inhaler Inhale 2 puffs Every 6 (Six) Hours As Needed for Wheezing. 6.7 g 3     No current facility-administered medications on file prior to visit.       Allergies   Allergen Reactions   • Nsaids    • Toradol [Ketorolac Tromethamine] Nausea Only        The following portions of the patient's history were reviewed and updated as appropriate: allergies, current medications, past family history, past medical history, past social history, past surgical history and problem list.    Review of Systems   Constitutional: Negative.    HENT: Negative.    Eyes: Negative.    Respiratory: Negative.    Cardiovascular: Negative.    Gastrointestinal: Negative.    Endocrine: Negative.    Genitourinary: Negative.    Musculoskeletal: Positive for arthralgias.   Skin: Negative.    Allergic/Immunologic: Negative.    Neurological: Negative.    Hematological: Negative.    Psychiatric/Behavioral: Negative.         Objective      Physical Exam  Pulse 91   Ht 165.1 cm (65\")   Wt 65 kg (143 lb 3.2 oz)   SpO2 99%   BMI 23.83 kg/m²     Body mass index is 23.83 kg/m².    GENERAL APPEARANCE: awake, alert & oriented x 3, in no acute distress and well developed, well nourished  PSYCH: normal mood and affect  LUNGS:  breathing nonlabored, no wheezing  EYES: sclera anicteric, pupils equal  No change in right hip pain with internal and external rotation.  Negative straight leg raise.  Grossly motor sensory intact  Imaging/Studies  Imaging Results (Last 7 Days)     ** No results found for the last 168 hours. **        Previous MRI with bilateral hip avascular necrosis..  Her radiographs from August showed minimal joint space narrowing  Assessment/Plan        ICD-10-CM ICD-9-CM   1. Avascular " necrosis of bones of both hips (CMS/[facility])  M87.051 733.42    M87.052    2. Right hip pain  M25.551 719.45   3. Uncomplicated opioid dependence (CMS/[facility])  F11.20 304.00       Orders Placed This Encounter   Procedures   • Ambulatory Referral to Pain Management      I referred her to pain management.  I will see her back as needed.  She may eventually need hip replacement.  She needs to get off the Percocet 15's.  Hopefully they can treat her pain in a different manner.  She will continue to use her walker.  Medical Decision Making  Management Options : over-the-counter medicine    Sami Dunbar MD  11/04/20  13:11 EST         EMR Dragon/Transcription disclaimer:  Much of this encounter note is an electronic transcription of spoken language to printed text. Electronic transcription of spoken language may permit erroneous, or at times, nonsensical words or phrases to be inadvertently transcribed. Although I have reviewed the note for such errors, some may still exist.

## 2020-12-26 ENCOUNTER — APPOINTMENT (OUTPATIENT)
Dept: GENERAL RADIOLOGY | Facility: HOSPITAL | Age: 55
End: 2020-12-26

## 2020-12-26 ENCOUNTER — APPOINTMENT (OUTPATIENT)
Dept: ULTRASOUND IMAGING | Facility: HOSPITAL | Age: 55
End: 2020-12-26

## 2020-12-26 ENCOUNTER — APPOINTMENT (OUTPATIENT)
Dept: CT IMAGING | Facility: HOSPITAL | Age: 55
End: 2020-12-26

## 2020-12-26 ENCOUNTER — HOSPITAL ENCOUNTER (OUTPATIENT)
Facility: HOSPITAL | Age: 55
Setting detail: OBSERVATION
Discharge: HOME | End: 2020-12-27
Attending: EMERGENCY MEDICINE | Admitting: INTERNAL MEDICINE

## 2020-12-26 DIAGNOSIS — R10.11 RUQ PAIN: Primary | ICD-10-CM

## 2020-12-26 DIAGNOSIS — Z98.890 H/O URETEROSTOMY: ICD-10-CM

## 2020-12-26 DIAGNOSIS — Z72.0 TOBACCO ABUSE: ICD-10-CM

## 2020-12-26 DIAGNOSIS — K29.80 DUODENITIS: ICD-10-CM

## 2020-12-26 DIAGNOSIS — N28.9 RENAL INSUFFICIENCY: ICD-10-CM

## 2020-12-26 DIAGNOSIS — K80.50 CHOLEDOCHOLITHIASIS: ICD-10-CM

## 2020-12-26 PROBLEM — D75.1 ERYTHROCYTOSIS: Status: ACTIVE | Noted: 2020-12-26

## 2020-12-26 PROBLEM — N17.9 ACUTE ON CHRONIC KIDNEY FAILURE (HCC): Status: ACTIVE | Noted: 2020-12-26

## 2020-12-26 PROBLEM — N18.9 ACUTE ON CHRONIC KIDNEY FAILURE (HCC): Status: ACTIVE | Noted: 2020-12-26

## 2020-12-26 PROBLEM — E87.20 METABOLIC ACIDOSIS: Status: ACTIVE | Noted: 2020-12-26

## 2020-12-26 PROBLEM — E87.5 HYPERKALEMIA: Status: ACTIVE | Noted: 2020-12-26

## 2020-12-26 PROBLEM — N18.4 CKD (CHRONIC KIDNEY DISEASE) STAGE 4, GFR 15-29 ML/MIN (HCC): Status: ACTIVE | Noted: 2020-12-26

## 2020-12-26 PROBLEM — J44.9 COPD (CHRONIC OBSTRUCTIVE PULMONARY DISEASE) (HCC): Status: ACTIVE | Noted: 2020-12-26

## 2020-12-26 LAB
ALBUMIN SERPL-MCNC: 4.3 G/DL (ref 3.5–5.2)
ALBUMIN/GLOB SERPL: 1.1 G/DL
ALP SERPL-CCNC: 123 U/L (ref 39–117)
ALT SERPL W P-5'-P-CCNC: 11 U/L (ref 1–33)
ANION GAP SERPL CALCULATED.3IONS-SCNC: 15 MMOL/L (ref 5–15)
AST SERPL-CCNC: 16 U/L (ref 1–32)
BACTERIA UR QL AUTO: ABNORMAL /HPF
BASOPHILS # BLD AUTO: 0.05 10*3/MM3 (ref 0–0.2)
BASOPHILS NFR BLD AUTO: 0.8 % (ref 0–1.5)
BILIRUB SERPL-MCNC: 0.2 MG/DL (ref 0–1.2)
BILIRUB UR QL STRIP: NEGATIVE
BUN SERPL-MCNC: 45 MG/DL (ref 6–20)
BUN/CREAT SERPL: 23 (ref 7–25)
CALCIUM SPEC-SCNC: 10 MG/DL (ref 8.6–10.5)
CHLORIDE SERPL-SCNC: 106 MMOL/L (ref 98–107)
CLARITY UR: ABNORMAL
CO2 SERPL-SCNC: 16 MMOL/L (ref 22–29)
COLOR UR: YELLOW
CREAT SERPL-MCNC: 1.96 MG/DL (ref 0.57–1)
DEPRECATED RDW RBC AUTO: 48 FL (ref 37–54)
EOSINOPHIL # BLD AUTO: 0.11 10*3/MM3 (ref 0–0.4)
EOSINOPHIL NFR BLD AUTO: 1.8 % (ref 0.3–6.2)
ERYTHROCYTE [DISTWIDTH] IN BLOOD BY AUTOMATED COUNT: 13.2 % (ref 12.3–15.4)
FLUAV RNA RESP QL NAA+PROBE: NOT DETECTED
FLUBV RNA RESP QL NAA+PROBE: NOT DETECTED
GFR SERPL CREATININE-BSD FRML MDRD: 26 ML/MIN/1.73
GLOBULIN UR ELPH-MCNC: 4 GM/DL
GLUCOSE SERPL-MCNC: 91 MG/DL (ref 65–99)
GLUCOSE UR STRIP-MCNC: NEGATIVE MG/DL
HCT VFR BLD AUTO: 54.3 % (ref 34–46.6)
HGB BLD-MCNC: 17.2 G/DL (ref 12–15.9)
HGB UR QL STRIP.AUTO: ABNORMAL
HOLD SPECIMEN: NORMAL
HOLD SPECIMEN: NORMAL
HYALINE CASTS UR QL AUTO: ABNORMAL /LPF
IMM GRANULOCYTES # BLD AUTO: 0.02 10*3/MM3 (ref 0–0.05)
IMM GRANULOCYTES NFR BLD AUTO: 0.3 % (ref 0–0.5)
KETONES UR QL STRIP: NEGATIVE
LEUKOCYTE ESTERASE UR QL STRIP.AUTO: ABNORMAL
LIPASE SERPL-CCNC: 85 U/L (ref 13–60)
LYMPHOCYTES # BLD AUTO: 1.05 10*3/MM3 (ref 0.7–3.1)
LYMPHOCYTES NFR BLD AUTO: 17.5 % (ref 19.6–45.3)
MCH RBC QN AUTO: 31 PG (ref 26.6–33)
MCHC RBC AUTO-ENTMCNC: 31.7 G/DL (ref 31.5–35.7)
MCV RBC AUTO: 97.8 FL (ref 79–97)
MONOCYTES # BLD AUTO: 0.21 10*3/MM3 (ref 0.1–0.9)
MONOCYTES NFR BLD AUTO: 3.5 % (ref 5–12)
NEUTROPHILS NFR BLD AUTO: 4.57 10*3/MM3 (ref 1.7–7)
NEUTROPHILS NFR BLD AUTO: 76.1 % (ref 42.7–76)
NITRITE UR QL STRIP: POSITIVE
NRBC BLD AUTO-RTO: 0 /100 WBC (ref 0–0.2)
PH UR STRIP.AUTO: >=9 [PH] (ref 5–8)
PLATELET # BLD AUTO: 250 10*3/MM3 (ref 140–450)
PMV BLD AUTO: 8.9 FL (ref 6–12)
POTASSIUM SERPL-SCNC: 5.6 MMOL/L (ref 3.5–5.2)
PROT SERPL-MCNC: 8.3 G/DL (ref 6–8.5)
PROT UR QL STRIP: ABNORMAL
QT INTERVAL: 406 MS
QT INTERVAL: 424 MS
QTC INTERVAL: 438 MS
QTC INTERVAL: 454 MS
RBC # BLD AUTO: 5.55 10*6/MM3 (ref 3.77–5.28)
RBC # UR: ABNORMAL /HPF
REF LAB TEST METHOD: ABNORMAL
SARS-COV-2 RNA RESP QL NAA+PROBE: NOT DETECTED
SODIUM SERPL-SCNC: 137 MMOL/L (ref 136–145)
SP GR UR STRIP: 1.01 (ref 1–1.03)
SQUAMOUS #/AREA URNS HPF: ABNORMAL /HPF
TRI-PHOS CRY URNS QL MICRO: ABNORMAL /HPF
TROPONIN T SERPL-MCNC: <0.01 NG/ML (ref 0–0.03)
TROPONIN T SERPL-MCNC: <0.01 NG/ML (ref 0–0.03)
UNIDENT CRYS URNS QL MICRO: ABNORMAL /HPF
UROBILINOGEN UR QL STRIP: ABNORMAL
WBC # BLD AUTO: 6.01 10*3/MM3 (ref 3.4–10.8)
WBC UR QL AUTO: ABNORMAL /HPF
WHOLE BLOOD HOLD SPECIMEN: NORMAL
WHOLE BLOOD HOLD SPECIMEN: NORMAL

## 2020-12-26 PROCEDURE — 76705 ECHO EXAM OF ABDOMEN: CPT

## 2020-12-26 PROCEDURE — G0378 HOSPITAL OBSERVATION PER HR: HCPCS

## 2020-12-26 PROCEDURE — 93005 ELECTROCARDIOGRAM TRACING: CPT | Performed by: EMERGENCY MEDICINE

## 2020-12-26 PROCEDURE — 74176 CT ABD & PELVIS W/O CONTRAST: CPT

## 2020-12-26 PROCEDURE — 25010000002 HYDROMORPHONE PER 4 MG: Performed by: HOSPITALIST

## 2020-12-26 PROCEDURE — 96375 TX/PRO/DX INJ NEW DRUG ADDON: CPT

## 2020-12-26 PROCEDURE — 96374 THER/PROPH/DIAG INJ IV PUSH: CPT

## 2020-12-26 PROCEDURE — 96376 TX/PRO/DX INJ SAME DRUG ADON: CPT

## 2020-12-26 PROCEDURE — C9803 HOPD COVID-19 SPEC COLLECT: HCPCS

## 2020-12-26 PROCEDURE — 85025 COMPLETE CBC W/AUTO DIFF WBC: CPT | Performed by: EMERGENCY MEDICINE

## 2020-12-26 PROCEDURE — 25010000002 CEFTRIAXONE PER 250 MG: Performed by: INTERNAL MEDICINE

## 2020-12-26 PROCEDURE — 25010000002 CEFTRIAXONE PER 250 MG: Performed by: NURSE PRACTITIONER

## 2020-12-26 PROCEDURE — 84484 ASSAY OF TROPONIN QUANT: CPT | Performed by: EMERGENCY MEDICINE

## 2020-12-26 PROCEDURE — 87636 SARSCOV2 & INF A&B AMP PRB: CPT | Performed by: EMERGENCY MEDICINE

## 2020-12-26 PROCEDURE — 25010000002 HYDROMORPHONE PER 4 MG: Performed by: INTERNAL MEDICINE

## 2020-12-26 PROCEDURE — 83690 ASSAY OF LIPASE: CPT | Performed by: EMERGENCY MEDICINE

## 2020-12-26 PROCEDURE — 99220 PR INITIAL OBSERVATION CARE/DAY 70 MINUTES: CPT | Performed by: HOSPITALIST

## 2020-12-26 PROCEDURE — 87086 URINE CULTURE/COLONY COUNT: CPT | Performed by: EMERGENCY MEDICINE

## 2020-12-26 PROCEDURE — 80053 COMPREHEN METABOLIC PANEL: CPT | Performed by: EMERGENCY MEDICINE

## 2020-12-26 PROCEDURE — 25010000002 HYDROMORPHONE PER 4 MG: Performed by: EMERGENCY MEDICINE

## 2020-12-26 PROCEDURE — 71045 X-RAY EXAM CHEST 1 VIEW: CPT

## 2020-12-26 PROCEDURE — 99285 EMERGENCY DEPT VISIT HI MDM: CPT

## 2020-12-26 PROCEDURE — 81001 URINALYSIS AUTO W/SCOPE: CPT | Performed by: EMERGENCY MEDICINE

## 2020-12-26 PROCEDURE — 0 DIATRIZOATE MEGLUMINE & SODIUM PER 1 ML: Performed by: EMERGENCY MEDICINE

## 2020-12-26 PROCEDURE — 25010000002 ONDANSETRON PER 1 MG: Performed by: EMERGENCY MEDICINE

## 2020-12-26 PROCEDURE — 25010000002 LORAZEPAM PER 2 MG: Performed by: EMERGENCY MEDICINE

## 2020-12-26 RX ORDER — SODIUM CHLORIDE 0.9 % (FLUSH) 0.9 %
10 SYRINGE (ML) INJECTION AS NEEDED
Status: DISCONTINUED | OUTPATIENT
Start: 2020-12-26 | End: 2020-12-27 | Stop reason: HOSPADM

## 2020-12-26 RX ORDER — ACETAMINOPHEN 160 MG/5ML
650 SOLUTION ORAL EVERY 4 HOURS PRN
Status: DISCONTINUED | OUTPATIENT
Start: 2020-12-26 | End: 2020-12-27 | Stop reason: HOSPADM

## 2020-12-26 RX ORDER — NICOTINE 21 MG/24HR
1 PATCH, TRANSDERMAL 24 HOURS TRANSDERMAL
Status: COMPLETED | OUTPATIENT
Start: 2020-12-26 | End: 2020-12-27

## 2020-12-26 RX ORDER — ONDANSETRON 4 MG/1
4 TABLET, FILM COATED ORAL EVERY 6 HOURS PRN
Status: DISCONTINUED | OUTPATIENT
Start: 2020-12-26 | End: 2020-12-27 | Stop reason: HOSPADM

## 2020-12-26 RX ORDER — AMOXICILLIN 250 MG
2 CAPSULE ORAL 2 TIMES DAILY PRN
Status: DISCONTINUED | OUTPATIENT
Start: 2020-12-26 | End: 2020-12-27 | Stop reason: HOSPADM

## 2020-12-26 RX ORDER — LIDOCAINE HYDROCHLORIDE 20 MG/ML
15 SOLUTION OROPHARYNGEAL ONCE
Status: COMPLETED | OUTPATIENT
Start: 2020-12-26 | End: 2020-12-26

## 2020-12-26 RX ORDER — HYDROMORPHONE HYDROCHLORIDE 1 MG/ML
0.5 INJECTION, SOLUTION INTRAMUSCULAR; INTRAVENOUS; SUBCUTANEOUS ONCE
Status: COMPLETED | OUTPATIENT
Start: 2020-12-26 | End: 2020-12-26

## 2020-12-26 RX ORDER — OXYCODONE HYDROCHLORIDE 5 MG/1
10 TABLET ORAL EVERY 4 HOURS PRN
Status: DISCONTINUED | OUTPATIENT
Start: 2020-12-26 | End: 2020-12-27 | Stop reason: HOSPADM

## 2020-12-26 RX ORDER — GABAPENTIN 100 MG/1
100 CAPSULE ORAL EVERY 8 HOURS SCHEDULED
Status: DISCONTINUED | OUTPATIENT
Start: 2020-12-26 | End: 2020-12-27 | Stop reason: HOSPADM

## 2020-12-26 RX ORDER — SODIUM CHLORIDE 0.9 % (FLUSH) 0.9 %
10 SYRINGE (ML) INJECTION EVERY 12 HOURS SCHEDULED
Status: DISCONTINUED | OUTPATIENT
Start: 2020-12-26 | End: 2020-12-27 | Stop reason: HOSPADM

## 2020-12-26 RX ORDER — ONDANSETRON 2 MG/ML
4 INJECTION INTRAMUSCULAR; INTRAVENOUS ONCE
Status: COMPLETED | OUTPATIENT
Start: 2020-12-26 | End: 2020-12-26

## 2020-12-26 RX ORDER — SODIUM CHLORIDE 9 MG/ML
75 INJECTION, SOLUTION INTRAVENOUS CONTINUOUS
Status: DISCONTINUED | OUTPATIENT
Start: 2020-12-26 | End: 2020-12-27 | Stop reason: HOSPADM

## 2020-12-26 RX ORDER — POLYETHYLENE GLYCOL 3350 17 G
2 POWDER IN PACKET (EA) ORAL
Status: DISCONTINUED | OUTPATIENT
Start: 2020-12-26 | End: 2020-12-27 | Stop reason: HOSPADM

## 2020-12-26 RX ORDER — PANTOPRAZOLE SODIUM 40 MG/10ML
80 INJECTION, POWDER, LYOPHILIZED, FOR SOLUTION INTRAVENOUS ONCE
Status: COMPLETED | OUTPATIENT
Start: 2020-12-26 | End: 2020-12-26

## 2020-12-26 RX ORDER — ACETAMINOPHEN 650 MG/1
650 SUPPOSITORY RECTAL EVERY 4 HOURS PRN
Status: DISCONTINUED | OUTPATIENT
Start: 2020-12-26 | End: 2020-12-27 | Stop reason: HOSPADM

## 2020-12-26 RX ORDER — LORAZEPAM 2 MG/ML
1 INJECTION INTRAMUSCULAR ONCE
Status: COMPLETED | OUTPATIENT
Start: 2020-12-26 | End: 2020-12-26

## 2020-12-26 RX ORDER — PANTOPRAZOLE SODIUM 40 MG/10ML
40 INJECTION, POWDER, LYOPHILIZED, FOR SOLUTION INTRAVENOUS ONCE
Status: COMPLETED | OUTPATIENT
Start: 2020-12-26 | End: 2020-12-26

## 2020-12-26 RX ORDER — ONDANSETRON 2 MG/ML
4 INJECTION INTRAMUSCULAR; INTRAVENOUS EVERY 6 HOURS PRN
Status: DISCONTINUED | OUTPATIENT
Start: 2020-12-26 | End: 2020-12-27 | Stop reason: HOSPADM

## 2020-12-26 RX ORDER — ACETAMINOPHEN 325 MG/1
650 TABLET ORAL EVERY 4 HOURS PRN
Status: DISCONTINUED | OUTPATIENT
Start: 2020-12-26 | End: 2020-12-27 | Stop reason: HOSPADM

## 2020-12-26 RX ORDER — ALUMINA, MAGNESIA, AND SIMETHICONE 2400; 2400; 240 MG/30ML; MG/30ML; MG/30ML
15 SUSPENSION ORAL ONCE
Status: COMPLETED | OUTPATIENT
Start: 2020-12-26 | End: 2020-12-26

## 2020-12-26 RX ORDER — DULOXETIN HYDROCHLORIDE 60 MG/1
60 CAPSULE, DELAYED RELEASE ORAL EVERY 12 HOURS SCHEDULED
Status: DISCONTINUED | OUTPATIENT
Start: 2020-12-26 | End: 2020-12-27 | Stop reason: HOSPADM

## 2020-12-26 RX ORDER — HYDROXYZINE HYDROCHLORIDE 25 MG/1
50 TABLET, FILM COATED ORAL 3 TIMES DAILY PRN
Status: DISCONTINUED | OUTPATIENT
Start: 2020-12-26 | End: 2020-12-27 | Stop reason: HOSPADM

## 2020-12-26 RX ORDER — ALBUTEROL SULFATE 2.5 MG/3ML
2.5 SOLUTION RESPIRATORY (INHALATION) EVERY 6 HOURS PRN
Status: DISCONTINUED | OUTPATIENT
Start: 2020-12-26 | End: 2020-12-27 | Stop reason: HOSPADM

## 2020-12-26 RX ORDER — IPRATROPIUM BROMIDE AND ALBUTEROL SULFATE 2.5; .5 MG/3ML; MG/3ML
3 SOLUTION RESPIRATORY (INHALATION) EVERY 6 HOURS PRN
Status: DISCONTINUED | OUTPATIENT
Start: 2020-12-26 | End: 2020-12-27 | Stop reason: HOSPADM

## 2020-12-26 RX ORDER — HYDROMORPHONE HYDROCHLORIDE 1 MG/ML
0.5 INJECTION, SOLUTION INTRAMUSCULAR; INTRAVENOUS; SUBCUTANEOUS
Status: DISCONTINUED | OUTPATIENT
Start: 2020-12-26 | End: 2020-12-27 | Stop reason: HOSPADM

## 2020-12-26 RX ORDER — METOPROLOL SUCCINATE 25 MG/1
25 TABLET, EXTENDED RELEASE ORAL DAILY
Status: DISCONTINUED | OUTPATIENT
Start: 2020-12-27 | End: 2020-12-27 | Stop reason: HOSPADM

## 2020-12-26 RX ORDER — LORAZEPAM 1 MG/1
1 TABLET ORAL EVERY 6 HOURS PRN
Status: DISCONTINUED | OUTPATIENT
Start: 2020-12-26 | End: 2020-12-27 | Stop reason: HOSPADM

## 2020-12-26 RX ADMIN — ALUMINUM HYDROXIDE, MAGNESIUM HYDROXIDE, AND DIMETHICONE 15 ML: 400; 400; 40 SUSPENSION ORAL at 15:18

## 2020-12-26 RX ADMIN — PANTOPRAZOLE SODIUM 40 MG: 40 INJECTION, POWDER, FOR SOLUTION INTRAVENOUS at 15:18

## 2020-12-26 RX ADMIN — CEFTRIAXONE SODIUM 1 G: 1 INJECTION, POWDER, FOR SOLUTION INTRAMUSCULAR; INTRAVENOUS at 19:56

## 2020-12-26 RX ADMIN — SODIUM ZIRCONIUM CYCLOSILICATE 10 G: 10 POWDER, FOR SUSPENSION ORAL at 19:56

## 2020-12-26 RX ADMIN — LORAZEPAM 1 MG: 2 INJECTION INTRAMUSCULAR; INTRAVENOUS at 18:02

## 2020-12-26 RX ADMIN — HYDROMORPHONE HYDROCHLORIDE 0.5 MG: 1 INJECTION, SOLUTION INTRAMUSCULAR; INTRAVENOUS; SUBCUTANEOUS at 21:49

## 2020-12-26 RX ADMIN — SODIUM CHLORIDE 75 ML/HR: 9 INJECTION, SOLUTION INTRAVENOUS at 19:56

## 2020-12-26 RX ADMIN — SODIUM CHLORIDE 500 ML: 9 INJECTION, SOLUTION INTRAVENOUS at 17:06

## 2020-12-26 RX ADMIN — HYDROMORPHONE HYDROCHLORIDE 0.5 MG: 1 INJECTION, SOLUTION INTRAMUSCULAR; INTRAVENOUS; SUBCUTANEOUS at 15:17

## 2020-12-26 RX ADMIN — Medication 15 ML: at 15:17

## 2020-12-26 RX ADMIN — HYDROXYZINE HYDROCHLORIDE 50 MG: 25 TABLET, FILM COATED ORAL at 19:56

## 2020-12-26 RX ADMIN — GABAPENTIN 100 MG: 100 CAPSULE ORAL at 20:00

## 2020-12-26 RX ADMIN — LIDOCAINE HYDROCHLORIDE 15 ML: 20 SOLUTION ORAL; TOPICAL at 15:18

## 2020-12-26 RX ADMIN — NICOTINE POLACRILEX 2 MG: 2 GUM, CHEWING BUCCAL at 22:32

## 2020-12-26 RX ADMIN — DULOXETINE HYDROCHLORIDE 60 MG: 60 CAPSULE, DELAYED RELEASE ORAL at 19:57

## 2020-12-26 RX ADMIN — ONDANSETRON 4 MG: 2 INJECTION INTRAMUSCULAR; INTRAVENOUS at 13:02

## 2020-12-26 RX ADMIN — LORAZEPAM 1 MG: 1 TABLET ORAL at 21:49

## 2020-12-26 RX ADMIN — Medication 1 PATCH: at 16:07

## 2020-12-26 RX ADMIN — PANTOPRAZOLE SODIUM 40 MG: 40 INJECTION, POWDER, FOR SOLUTION INTRAVENOUS at 13:02

## 2020-12-26 RX ADMIN — HYDROMORPHONE HYDROCHLORIDE 0.5 MG: 1 INJECTION, SOLUTION INTRAMUSCULAR; INTRAVENOUS; SUBCUTANEOUS at 13:02

## 2020-12-26 RX ADMIN — HYDROMORPHONE HYDROCHLORIDE 0.5 MG: 1 INJECTION, SOLUTION INTRAMUSCULAR; INTRAVENOUS; SUBCUTANEOUS at 18:02

## 2020-12-26 RX ADMIN — SODIUM CHLORIDE 500 ML: 9 INJECTION, SOLUTION INTRAVENOUS at 13:02

## 2020-12-26 RX ADMIN — OXYCODONE 10 MG: 5 TABLET ORAL at 19:57

## 2020-12-27 ENCOUNTER — APPOINTMENT (OUTPATIENT)
Dept: GENERAL RADIOLOGY | Facility: HOSPITAL | Age: 55
End: 2020-12-27

## 2020-12-27 ENCOUNTER — APPOINTMENT (OUTPATIENT)
Dept: MRI IMAGING | Facility: HOSPITAL | Age: 55
End: 2020-12-27

## 2020-12-27 ENCOUNTER — ANESTHESIA EVENT (OUTPATIENT)
Dept: GASTROENTEROLOGY | Facility: HOSPITAL | Age: 55
End: 2020-12-27

## 2020-12-27 ENCOUNTER — ANESTHESIA (OUTPATIENT)
Dept: GASTROENTEROLOGY | Facility: HOSPITAL | Age: 55
End: 2020-12-27

## 2020-12-27 VITALS
DIASTOLIC BLOOD PRESSURE: 52 MMHG | SYSTOLIC BLOOD PRESSURE: 131 MMHG | OXYGEN SATURATION: 95 % | HEIGHT: 65 IN | TEMPERATURE: 96.5 F | BODY MASS INDEX: 24.36 KG/M2 | HEART RATE: 68 BPM | WEIGHT: 146.2 LBS | RESPIRATION RATE: 18 BRPM

## 2020-12-27 PROBLEM — R10.11 RUQ PAIN: Status: ACTIVE | Noted: 2020-12-27

## 2020-12-27 LAB
ALBUMIN SERPL-MCNC: 3.4 G/DL (ref 3.5–5.2)
ALBUMIN/GLOB SERPL: 1.2 G/DL
ALP SERPL-CCNC: 120 U/L (ref 39–117)
ALT SERPL W P-5'-P-CCNC: 16 U/L (ref 1–33)
ANION GAP SERPL CALCULATED.3IONS-SCNC: 10 MMOL/L (ref 5–15)
AST SERPL-CCNC: 20 U/L (ref 1–32)
BACTERIA SPEC AEROBE CULT: NORMAL
BASOPHILS # BLD AUTO: 0.03 10*3/MM3 (ref 0–0.2)
BASOPHILS NFR BLD AUTO: 0.4 % (ref 0–1.5)
BILIRUB SERPL-MCNC: 0.2 MG/DL (ref 0–1.2)
BUN SERPL-MCNC: 32 MG/DL (ref 6–20)
BUN/CREAT SERPL: 18.5 (ref 7–25)
CALCIUM SPEC-SCNC: 8.5 MG/DL (ref 8.6–10.5)
CHLORIDE SERPL-SCNC: 111 MMOL/L (ref 98–107)
CHOLEST SERPL-MCNC: 194 MG/DL (ref 0–200)
CO2 SERPL-SCNC: 19 MMOL/L (ref 22–29)
CREAT SERPL-MCNC: 1.73 MG/DL (ref 0.57–1)
DEPRECATED RDW RBC AUTO: 49.7 FL (ref 37–54)
EOSINOPHIL # BLD AUTO: 0.07 10*3/MM3 (ref 0–0.4)
EOSINOPHIL NFR BLD AUTO: 1 % (ref 0.3–6.2)
ERYTHROCYTE [DISTWIDTH] IN BLOOD BY AUTOMATED COUNT: 13.4 % (ref 12.3–15.4)
GFR SERPL CREATININE-BSD FRML MDRD: 31 ML/MIN/1.73
GLOBULIN UR ELPH-MCNC: 2.9 GM/DL
GLUCOSE SERPL-MCNC: 87 MG/DL (ref 65–99)
HBA1C MFR BLD: 5.6 % (ref 4.8–5.6)
HCT VFR BLD AUTO: 43.8 % (ref 34–46.6)
HDLC SERPL-MCNC: 37 MG/DL (ref 40–60)
HGB BLD-MCNC: 13.1 G/DL (ref 12–15.9)
IMM GRANULOCYTES # BLD AUTO: 0.03 10*3/MM3 (ref 0–0.05)
IMM GRANULOCYTES NFR BLD AUTO: 0.4 % (ref 0–0.5)
INR PPP: 1.05 (ref 0.85–1.16)
LDLC SERPL CALC-MCNC: 111 MG/DL (ref 0–100)
LDLC/HDLC SERPL: 2.79 {RATIO}
LIPASE SERPL-CCNC: 57 U/L (ref 13–60)
LYMPHOCYTES # BLD AUTO: 0.47 10*3/MM3 (ref 0.7–3.1)
LYMPHOCYTES NFR BLD AUTO: 6.5 % (ref 19.6–45.3)
MAGNESIUM SERPL-MCNC: 1.9 MG/DL (ref 1.6–2.6)
MCH RBC QN AUTO: 30.3 PG (ref 26.6–33)
MCHC RBC AUTO-ENTMCNC: 29.9 G/DL (ref 31.5–35.7)
MCV RBC AUTO: 101.2 FL (ref 79–97)
MONOCYTES # BLD AUTO: 0.18 10*3/MM3 (ref 0.1–0.9)
MONOCYTES NFR BLD AUTO: 2.5 % (ref 5–12)
NEUTROPHILS NFR BLD AUTO: 6.5 10*3/MM3 (ref 1.7–7)
NEUTROPHILS NFR BLD AUTO: 89.2 % (ref 42.7–76)
NRBC BLD AUTO-RTO: 0 /100 WBC (ref 0–0.2)
PLATELET # BLD AUTO: 202 10*3/MM3 (ref 140–450)
PMV BLD AUTO: 8.7 FL (ref 6–12)
POTASSIUM SERPL-SCNC: 4.8 MMOL/L (ref 3.5–5.2)
PROT SERPL-MCNC: 6.3 G/DL (ref 6–8.5)
PROTHROMBIN TIME: 13.5 SECONDS (ref 11.5–14)
RBC # BLD AUTO: 4.33 10*6/MM3 (ref 3.77–5.28)
SODIUM SERPL-SCNC: 140 MMOL/L (ref 136–145)
TRIGL SERPL-MCNC: 268 MG/DL (ref 0–150)
VLDLC SERPL-MCNC: 46 MG/DL (ref 5–40)
WBC # BLD AUTO: 7.28 10*3/MM3 (ref 3.4–10.8)

## 2020-12-27 PROCEDURE — G0378 HOSPITAL OBSERVATION PER HR: HCPCS

## 2020-12-27 PROCEDURE — 43262 ENDO CHOLANGIOPANCREATOGRAPH: CPT | Performed by: INTERNAL MEDICINE

## 2020-12-27 PROCEDURE — 74181 MRI ABDOMEN W/O CONTRAST: CPT

## 2020-12-27 PROCEDURE — 99214 OFFICE O/P EST MOD 30 MIN: CPT | Performed by: NURSE PRACTITIONER

## 2020-12-27 PROCEDURE — C1769 GUIDE WIRE: HCPCS | Performed by: INTERNAL MEDICINE

## 2020-12-27 PROCEDURE — 85025 COMPLETE CBC W/AUTO DIFF WBC: CPT | Performed by: NURSE PRACTITIONER

## 2020-12-27 PROCEDURE — 25010000002 NEOSTIGMINE 10 MG/10ML SOLUTION: Performed by: NURSE ANESTHETIST, CERTIFIED REGISTERED

## 2020-12-27 PROCEDURE — 85610 PROTHROMBIN TIME: CPT | Performed by: HOSPITALIST

## 2020-12-27 PROCEDURE — 25010000002 HYDROMORPHONE PER 4 MG: Performed by: HOSPITALIST

## 2020-12-27 PROCEDURE — 80061 LIPID PANEL: CPT | Performed by: NURSE PRACTITIONER

## 2020-12-27 PROCEDURE — 25010000002 DEXAMETHASONE PER 1 MG: Performed by: NURSE ANESTHETIST, CERTIFIED REGISTERED

## 2020-12-27 PROCEDURE — 25010000002 HYDROMORPHONE PER 4 MG: Performed by: INTERNAL MEDICINE

## 2020-12-27 PROCEDURE — 83690 ASSAY OF LIPASE: CPT | Performed by: NURSE PRACTITIONER

## 2020-12-27 PROCEDURE — 83036 HEMOGLOBIN GLYCOSYLATED A1C: CPT | Performed by: NURSE PRACTITIONER

## 2020-12-27 PROCEDURE — 25010000002 ONDANSETRON PER 1 MG: Performed by: NURSE ANESTHETIST, CERTIFIED REGISTERED

## 2020-12-27 PROCEDURE — 99217 PR OBSERVATION CARE DISCHARGE MANAGEMENT: CPT | Performed by: INTERNAL MEDICINE

## 2020-12-27 PROCEDURE — 83735 ASSAY OF MAGNESIUM: CPT | Performed by: NURSE PRACTITIONER

## 2020-12-27 PROCEDURE — 88341 IMHCHEM/IMCYTCHM EA ADD ANTB: CPT | Performed by: INTERNAL MEDICINE

## 2020-12-27 PROCEDURE — 80053 COMPREHEN METABOLIC PANEL: CPT | Performed by: NURSE PRACTITIONER

## 2020-12-27 PROCEDURE — 88305 TISSUE EXAM BY PATHOLOGIST: CPT | Performed by: INTERNAL MEDICINE

## 2020-12-27 PROCEDURE — 74330 X-RAY BILE/PANC ENDOSCOPY: CPT

## 2020-12-27 PROCEDURE — 96376 TX/PRO/DX INJ SAME DRUG ADON: CPT

## 2020-12-27 PROCEDURE — 25010000002 PROPOFOL 10 MG/ML EMULSION: Performed by: NURSE ANESTHETIST, CERTIFIED REGISTERED

## 2020-12-27 PROCEDURE — 43261 ENDO CHOLANGIOPANCREATOGRAPH: CPT | Performed by: INTERNAL MEDICINE

## 2020-12-27 PROCEDURE — 88342 IMHCHEM/IMCYTCHM 1ST ANTB: CPT | Performed by: INTERNAL MEDICINE

## 2020-12-27 RX ORDER — FAMOTIDINE 10 MG/ML
20 INJECTION, SOLUTION INTRAVENOUS ONCE
Status: COMPLETED | OUTPATIENT
Start: 2020-12-27 | End: 2020-12-27

## 2020-12-27 RX ORDER — FAMOTIDINE 20 MG/1
20 TABLET, FILM COATED ORAL ONCE
Status: DISCONTINUED | OUTPATIENT
Start: 2020-12-27 | End: 2020-12-27 | Stop reason: HOSPADM

## 2020-12-27 RX ORDER — PROPOFOL 10 MG/ML
VIAL (ML) INTRAVENOUS AS NEEDED
Status: DISCONTINUED | OUTPATIENT
Start: 2020-12-27 | End: 2020-12-27 | Stop reason: SURG

## 2020-12-27 RX ORDER — GLYCOPYRROLATE 0.2 MG/ML
INJECTION INTRAMUSCULAR; INTRAVENOUS AS NEEDED
Status: DISCONTINUED | OUTPATIENT
Start: 2020-12-27 | End: 2020-12-27 | Stop reason: SURG

## 2020-12-27 RX ORDER — SODIUM CHLORIDE, SODIUM LACTATE, POTASSIUM CHLORIDE, CALCIUM CHLORIDE 600; 310; 30; 20 MG/100ML; MG/100ML; MG/100ML; MG/100ML
30 INJECTION, SOLUTION INTRAVENOUS CONTINUOUS PRN
Status: DISCONTINUED | OUTPATIENT
Start: 2020-12-27 | End: 2020-12-27 | Stop reason: HOSPADM

## 2020-12-27 RX ORDER — IPRATROPIUM BROMIDE AND ALBUTEROL SULFATE 2.5; .5 MG/3ML; MG/3ML
3 SOLUTION RESPIRATORY (INHALATION) ONCE AS NEEDED
Status: DISCONTINUED | OUTPATIENT
Start: 2020-12-27 | End: 2020-12-27 | Stop reason: HOSPADM

## 2020-12-27 RX ORDER — LIDOCAINE HYDROCHLORIDE 10 MG/ML
INJECTION, SOLUTION EPIDURAL; INFILTRATION; INTRACAUDAL; PERINEURAL AS NEEDED
Status: DISCONTINUED | OUTPATIENT
Start: 2020-12-27 | End: 2020-12-27 | Stop reason: SURG

## 2020-12-27 RX ORDER — DEXAMETHASONE SODIUM PHOSPHATE 4 MG/ML
INJECTION, SOLUTION INTRA-ARTICULAR; INTRALESIONAL; INTRAMUSCULAR; INTRAVENOUS; SOFT TISSUE AS NEEDED
Status: DISCONTINUED | OUTPATIENT
Start: 2020-12-27 | End: 2020-12-27 | Stop reason: SURG

## 2020-12-27 RX ORDER — ONDANSETRON 2 MG/ML
4 INJECTION INTRAMUSCULAR; INTRAVENOUS ONCE AS NEEDED
Status: DISCONTINUED | OUTPATIENT
Start: 2020-12-27 | End: 2020-12-27 | Stop reason: HOSPADM

## 2020-12-27 RX ORDER — SODIUM CHLORIDE 0.9 % (FLUSH) 0.9 %
10 SYRINGE (ML) INJECTION AS NEEDED
Status: DISCONTINUED | OUTPATIENT
Start: 2020-12-27 | End: 2020-12-27 | Stop reason: HOSPADM

## 2020-12-27 RX ORDER — TIZANIDINE 4 MG/1
4 TABLET ORAL EVERY 8 HOURS
Qty: 90 TABLET | Refills: 2 | Status: SHIPPED | OUTPATIENT
Start: 2020-12-27

## 2020-12-27 RX ORDER — SODIUM CHLORIDE 0.9 % (FLUSH) 0.9 %
10 SYRINGE (ML) INJECTION EVERY 12 HOURS SCHEDULED
Status: DISCONTINUED | OUTPATIENT
Start: 2020-12-27 | End: 2020-12-27 | Stop reason: HOSPADM

## 2020-12-27 RX ORDER — ONDANSETRON 2 MG/ML
INJECTION INTRAMUSCULAR; INTRAVENOUS AS NEEDED
Status: DISCONTINUED | OUTPATIENT
Start: 2020-12-27 | End: 2020-12-27 | Stop reason: SURG

## 2020-12-27 RX ORDER — LIDOCAINE HYDROCHLORIDE 10 MG/ML
0.5 INJECTION, SOLUTION EPIDURAL; INFILTRATION; INTRACAUDAL; PERINEURAL ONCE AS NEEDED
Status: DISCONTINUED | OUTPATIENT
Start: 2020-12-27 | End: 2020-12-27 | Stop reason: HOSPADM

## 2020-12-27 RX ORDER — NEOSTIGMINE METHYLSULFATE 1 MG/ML
INJECTION, SOLUTION INTRAVENOUS AS NEEDED
Status: DISCONTINUED | OUTPATIENT
Start: 2020-12-27 | End: 2020-12-27 | Stop reason: SURG

## 2020-12-27 RX ORDER — CEPHALEXIN 500 MG/1
500 CAPSULE ORAL 2 TIMES DAILY
Qty: 8 CAPSULE | Refills: 0 | Status: SHIPPED | OUTPATIENT
Start: 2020-12-27 | End: 2020-12-31

## 2020-12-27 RX ORDER — PANTOPRAZOLE SODIUM 40 MG/1
40 TABLET, DELAYED RELEASE ORAL DAILY
Qty: 30 TABLET | Refills: 0 | Status: SHIPPED | OUTPATIENT
Start: 2020-12-27 | End: 2021-01-26

## 2020-12-27 RX ORDER — SODIUM CHLORIDE, SODIUM LACTATE, POTASSIUM CHLORIDE, CALCIUM CHLORIDE 600; 310; 30; 20 MG/100ML; MG/100ML; MG/100ML; MG/100ML
9 INJECTION, SOLUTION INTRAVENOUS CONTINUOUS
Status: DISCONTINUED | OUTPATIENT
Start: 2020-12-27 | End: 2020-12-27 | Stop reason: HOSPADM

## 2020-12-27 RX ORDER — FENTANYL CITRATE 50 UG/ML
50 INJECTION, SOLUTION INTRAMUSCULAR; INTRAVENOUS
Status: DISCONTINUED | OUTPATIENT
Start: 2020-12-27 | End: 2020-12-27 | Stop reason: HOSPADM

## 2020-12-27 RX ORDER — ROCURONIUM BROMIDE 10 MG/ML
INJECTION, SOLUTION INTRAVENOUS AS NEEDED
Status: DISCONTINUED | OUTPATIENT
Start: 2020-12-27 | End: 2020-12-27 | Stop reason: SURG

## 2020-12-27 RX ADMIN — DULOXETINE HYDROCHLORIDE 60 MG: 60 CAPSULE, DELAYED RELEASE ORAL at 09:11

## 2020-12-27 RX ADMIN — GLYCOPYRROLATE 0.4 MG: 0.2 INJECTION INTRAMUSCULAR; INTRAVENOUS at 11:30

## 2020-12-27 RX ADMIN — NEOSTIGMINE 3 MG: 1 INJECTION INTRAVENOUS at 11:30

## 2020-12-27 RX ADMIN — ROCURONIUM BROMIDE 25 MG: 10 INJECTION INTRAVENOUS at 10:51

## 2020-12-27 RX ADMIN — PROPOFOL 150 MG: 10 INJECTION, EMULSION INTRAVENOUS at 10:51

## 2020-12-27 RX ADMIN — NICOTINE POLACRILEX 2 MG: 2 GUM, CHEWING BUCCAL at 05:03

## 2020-12-27 RX ADMIN — NICOTINE POLACRILEX 2 MG: 2 GUM, CHEWING BUCCAL at 01:02

## 2020-12-27 RX ADMIN — GABAPENTIN 100 MG: 100 CAPSULE ORAL at 05:03

## 2020-12-27 RX ADMIN — SUGAMMADEX 150 MG: 100 INJECTION, SOLUTION INTRAVENOUS at 11:44

## 2020-12-27 RX ADMIN — FAMOTIDINE 20 MG: 10 INJECTION INTRAVENOUS at 09:35

## 2020-12-27 RX ADMIN — NICOTINE POLACRILEX 2 MG: 2 GUM, CHEWING BUCCAL at 02:44

## 2020-12-27 RX ADMIN — LIDOCAINE HYDROCHLORIDE 50 MG: 10 INJECTION, SOLUTION EPIDURAL; INFILTRATION; INTRACAUDAL; PERINEURAL at 10:51

## 2020-12-27 RX ADMIN — DEXAMETHASONE SODIUM PHOSPHATE 8 MG: 4 INJECTION, SOLUTION INTRAMUSCULAR; INTRAVENOUS at 10:55

## 2020-12-27 RX ADMIN — GABAPENTIN 100 MG: 100 CAPSULE ORAL at 13:37

## 2020-12-27 RX ADMIN — OXYCODONE 10 MG: 5 TABLET ORAL at 13:53

## 2020-12-27 RX ADMIN — SODIUM CHLORIDE: 9 INJECTION, SOLUTION INTRAVENOUS at 11:18

## 2020-12-27 RX ADMIN — METOPROLOL SUCCINATE 25 MG: 25 TABLET, EXTENDED RELEASE ORAL at 09:12

## 2020-12-27 RX ADMIN — HYDROMORPHONE HYDROCHLORIDE 0.5 MG: 1 INJECTION, SOLUTION INTRAMUSCULAR; INTRAVENOUS; SUBCUTANEOUS at 01:02

## 2020-12-27 RX ADMIN — OXYCODONE 10 MG: 5 TABLET ORAL at 02:42

## 2020-12-27 RX ADMIN — ONDANSETRON 4 MG: 2 INJECTION INTRAMUSCULAR; INTRAVENOUS at 11:24

## 2020-12-27 RX ADMIN — OXYCODONE 10 MG: 5 TABLET ORAL at 09:16

## 2020-12-27 NOTE — ANESTHESIA PREPROCEDURE EVALUATION
Anesthesia Evaluation     Patient summary reviewed and Nursing notes reviewed   no history of anesthetic complications:  NPO Solid Status: > 8 hours  NPO Liquid Status: > 8 hours           Airway   Mallampati: II  TM distance: >3 FB  Neck ROM: full  No difficulty expected  Dental      Pulmonary - normal exam   (+) COPD, asthma,  Cardiovascular - normal exam    (+) hypertension, valvular problems/murmurs, angina,       Neuro/Psych  (+) psychiatric history,     GI/Hepatic/Renal/Endo    (+)   renal disease,     Musculoskeletal     Abdominal    Substance History      OB/GYN          Other      history of cancer                    Anesthesia Plan    ASA 2     general     intravenous induction     Anesthetic plan, all risks, benefits, and alternatives have been provided, discussed and informed consent has been obtained with: patient.    Plan discussed with CRNA.

## 2020-12-27 NOTE — ANESTHESIA POSTPROCEDURE EVALUATION
Patient: Darcy Palomino    Procedure Summary     Date: 12/27/20 Room / Location:  [facility]    Anesthesia Start: 1047 Anesthesia Stop:     Procedure: ENDOSCOPIC RETROGRADE CHOLANGIOPANCREATOGRAPHY (N/A ) Diagnosis:     Surgeon: Magdaleno Hoyt MD Provider: Elver Langford MD    Anesthesia Type: general ASA Status: 2          Anesthesia Type: general    Vitals  No vitals data found for the desired time range.          Post Anesthesia Care and Evaluation    Patient location during evaluation: PACU  Patient participation: complete - patient participated  Level of consciousness: awake and alert  Pain management: adequate  Airway patency: patent  Anesthetic complications: No anesthetic complications  PONV Status: none  Cardiovascular status: hemodynamically stable and acceptable  Respiratory status: nonlabored ventilation, acceptable and nasal cannula  Hydration status: acceptable

## 2020-12-27 NOTE — ANESTHESIA PROCEDURE NOTES
Airway  Urgency: elective    Date/Time: 12/27/2020 10:53 AM  Airway not difficult    General Information and Staff    Patient location during procedure: OR  CRNA: Inocente Zuniga CRNA    Indications and Patient Condition  Indications for airway management: airway protection    Preoxygenated: yes  MILS not maintained throughout  Mask difficulty assessment: 1 - vent by mask    Final Airway Details  Final airway type: endotracheal airway      Successful airway: ETT  Cuffed: yes   Successful intubation technique: direct laryngoscopy and video laryngoscopy  Facilitating devices/methods: intubating stylet, anterior pressure/BURP and Bougie  Endotracheal tube insertion site: oral  Blade: Doran  Blade size: 3  ETT size (mm): 6.5  Cormack-Lehane Classification: grade I - full view of glottis  Placement verified by: chest auscultation and capnometry   Cuff volume (mL): 6  Measured from: lips  ETT/EBT  to lips (cm): 20  Number of attempts at approach: 1  Assessment: lips, teeth, and gum same as pre-op and atraumatic intubation    Additional Comments  Negative epigastric sounds, Breath sound equal bilaterally with symmetric chest rise and fall. DL with Alcaraz 2 Grade IIb view, unable to pass bougie. DL with Doran 3 Grade IIa view, able to pass bougie successfully with successful intubation.

## 2020-12-28 ENCOUNTER — READMISSION MANAGEMENT (OUTPATIENT)
Dept: CALL CENTER | Facility: HOSPITAL | Age: 55
End: 2020-12-28

## 2020-12-28 NOTE — OUTREACH NOTE
Prep Survey      Responses   [facility] facility patient discharged from?  [place]   Is LACE score < 7 ?  No   Emergency Room discharge w/ pulse ox?  No   Eligibility  Readm Mgmt   Discharge diagnosis  Choledocholithiasis,  ERCP   Does the patient have one of the following disease processes/diagnoses(primary or secondary)?  Other   Does the patient have Home health ordered?  No   Is there a DME ordered?  No   Prep survey completed?  Yes          Meghan Swan RN

## 2020-12-29 ENCOUNTER — READMISSION MANAGEMENT (OUTPATIENT)
Dept: CALL CENTER | Facility: HOSPITAL | Age: 55
End: 2020-12-29

## 2020-12-29 NOTE — OUTREACH NOTE
Medical Week 1 Survey      Responses   [facility] patient discharged from?  [place]   Does the patient have one of the following disease processes/diagnoses(primary or secondary)?  Other   Week 1 attempt successful?  No   Unsuccessful attempts  Attempt 1          Meghan Segura LPN

## 2020-12-30 ENCOUNTER — READMISSION MANAGEMENT (OUTPATIENT)
Dept: CALL CENTER | Facility: HOSPITAL | Age: 55
End: 2020-12-30

## 2020-12-30 LAB
CYTO UR: NORMAL
LAB AP CASE REPORT: NORMAL
LAB AP CLINICAL INFORMATION: NORMAL
LAB AP SPECIAL STAINS: NORMAL
PATH REPORT.FINAL DX SPEC: NORMAL
PATH REPORT.GROSS SPEC: NORMAL

## 2020-12-30 NOTE — OUTREACH NOTE
Medical Week 1 Survey      Responses   [facility] patient discharged from?  [place]   Does the patient have one of the following disease processes/diagnoses(primary or secondary)?  Other   Week 1 attempt successful?  No   Revoke  Decline to participate          Keara Velez RN

## 2021-03-10 ENCOUNTER — OFFICE VISIT (OUTPATIENT)
Dept: ORTHOPEDIC SURGERY | Facility: CLINIC | Age: 56
End: 2021-03-10

## 2021-03-10 VITALS
DIASTOLIC BLOOD PRESSURE: 49 MMHG | HEIGHT: 65 IN | WEIGHT: 143 LBS | SYSTOLIC BLOOD PRESSURE: 143 MMHG | BODY MASS INDEX: 23.82 KG/M2 | HEART RATE: 84 BPM

## 2021-03-10 DIAGNOSIS — Z85.41 HISTORY OF CERVICAL CANCER: ICD-10-CM

## 2021-03-10 DIAGNOSIS — M25.551 RIGHT HIP PAIN: ICD-10-CM

## 2021-03-10 DIAGNOSIS — J44.9 CHRONIC OBSTRUCTIVE PULMONARY DISEASE, UNSPECIFIED COPD TYPE (HCC): ICD-10-CM

## 2021-03-10 DIAGNOSIS — N18.31 STAGE 3A CHRONIC KIDNEY DISEASE (HCC): ICD-10-CM

## 2021-03-10 DIAGNOSIS — M87.052 AVASCULAR NECROSIS OF BONES OF BOTH HIPS (HCC): Primary | ICD-10-CM

## 2021-03-10 DIAGNOSIS — F11.20 UNCOMPLICATED OPIOID DEPENDENCE (HCC): ICD-10-CM

## 2021-03-10 DIAGNOSIS — M87.051 AVASCULAR NECROSIS OF BONES OF BOTH HIPS (HCC): Primary | ICD-10-CM

## 2021-03-10 PROCEDURE — 99213 OFFICE O/P EST LOW 20 MIN: CPT | Performed by: ORTHOPAEDIC SURGERY

## 2021-03-10 RX ORDER — PREDNISONE 50 MG/1
TABLET ORAL
COMMUNITY
Start: 2021-01-14

## 2021-03-10 RX ORDER — CLINDAMYCIN HYDROCHLORIDE 300 MG/1
CAPSULE ORAL
COMMUNITY
Start: 2021-02-23

## 2021-03-10 RX ORDER — ROPINIROLE 0.5 MG/1
TABLET, FILM COATED ORAL
COMMUNITY

## 2021-03-10 RX ORDER — HYDROXYZINE PAMOATE 25 MG/1
CAPSULE ORAL
COMMUNITY
Start: 2021-01-06 | End: 2021-03-10 | Stop reason: SDUPTHER

## 2021-03-10 RX ORDER — BUSPIRONE HYDROCHLORIDE 10 MG/1
TABLET ORAL
COMMUNITY
Start: 2021-02-11

## 2021-03-10 RX ORDER — ATORVASTATIN CALCIUM 80 MG/1
TABLET, FILM COATED ORAL
COMMUNITY
Start: 2021-02-11

## 2021-03-10 RX ORDER — OMEPRAZOLE 20 MG/1
CAPSULE, DELAYED RELEASE ORAL
COMMUNITY

## 2021-03-10 RX ORDER — HYDROXYZINE HYDROCHLORIDE 25 MG/1
TABLET, FILM COATED ORAL
COMMUNITY
Start: 2020-12-13

## 2021-03-10 RX ORDER — PROMETHAZINE HYDROCHLORIDE 25 MG/1
TABLET ORAL
COMMUNITY
Start: 2021-03-02

## 2021-03-10 RX ORDER — MONTELUKAST SODIUM 10 MG/1
TABLET ORAL
COMMUNITY
Start: 2020-12-29

## 2021-03-10 RX ORDER — FLUTICASONE PROPIONATE 50 MCG
SPRAY, SUSPENSION (ML) NASAL
COMMUNITY
Start: 2020-12-13

## 2021-03-10 RX ORDER — OXYCODONE HYDROCHLORIDE 15 MG/1
TABLET ORAL
COMMUNITY
Start: 2021-02-25

## 2021-03-10 NOTE — PROGRESS NOTES
"      [facility] [place] [facility] Note    Subjective     CC: Follow-up (4 months follow up for Avascular necrosis of bones of right hip)      KATIUSKA Palomino is a 55 y.o. female.  She has worsening right hip pain.  She has had this for about a year.  She elected surgery.  She takes three Percocet 15 the day.  She says she smokes half pack per day.  She has a history of bladder and cervical cancer.  She has a history of COPD.  History of renal failure.    Review of Systems   Constitutional: Negative.  Negative for chills, fatigue and fever.   HENT: Negative.  Negative for congestion and dental problem.    Eyes: Negative.  Negative for blurred vision.   Respiratory: Negative.  Negative for shortness of breath.    Cardiovascular: Negative.  Negative for leg swelling.   Gastrointestinal: Negative.  Negative for abdominal pain.   Endocrine: Negative.  Negative for polyuria.   Genitourinary: Negative.  Negative for difficulty urinating.   Musculoskeletal: Positive for arthralgias.   Skin: Negative.    Allergic/Immunologic: Negative.    Neurological: Negative.    Hematological: Negative.  Negative for adenopathy.   Psychiatric/Behavioral: Negative.  Negative for behavioral problems.       ROS:    Constiutional:Pt denies fever, chills, nausea, or vomiting.  MSK:as above      Objective      Past Medical History  Past Medical History:   Diagnosis Date   • Anxiety    • Asthma    • Bladder cancer (CMS/HCC)    • Cervical cancer (CMS/HCC)    • Chronic bronchitis (CMS/HCC)    • COPD (chronic obstructive pulmonary disease) (CMS/HCC)    • Depression    • Kidney disease          Physical Exam  /49   Pulse 84   Ht 165.1 cm (65\")   Wt 64.9 kg (143 lb)   BMI 23.80 kg/m²     Body mass index is 23.8 kg/m².    Patient is well nourished and well developed.        Ortho Exam  She walks with a coxalgia gait on the right.  Extreme pain with right hip range of motion.    Imaging/Labs/EMG Reviewed:  Imaging Results (Last 24 " Hours)     ** No results found for the last 24 hours. **          Assessment:  1. Avascular necrosis of bones of both hips (CMS/HCC)    2. Right hip pain    3. Uncomplicated opioid dependence (CMS/HCC)    4. History of cervical cancer    5. Stage 3a chronic kidney disease (CMS/HCC)    6. Chronic obstructive pulmonary disease, unspecified COPD type (CMS/HCC)        Plan:  1. Recommend over the counter anti-inflammatories for pain and/or swelling  2. I have referred her to  for possible surgical consideration.  Not sure she is a surgical candidate based upon her kidney and lung disease.  She is in pain management getting Percocet 15's.  She will continue that.  I will see her back as needed.  She did not respond to the hip steroid injection.    Follow Up:   Return if symptoms worsen or fail to improve.      Medical Decision Making  Management Options : Low - OTC Drugs        Sami Dunbar M.D., FAAOS  Orthopedic Surgeon  Fellowship Trained Sports Medicine  [facility]  Orthopedics and Sports Medicine  [address], [address]  [place] [address]

## 2021-05-21 DIAGNOSIS — Z01.812 BLOOD TESTS PRIOR TO TREATMENT OR PROCEDURE: Primary | ICD-10-CM

## (undated) DEVICE — HYBRID CO2 TUBING/CAP SET FOR OLYMPUS® SCOPES & CO2 SOURCE: Brand: ERBE

## (undated) DEVICE — ERBE NESSY®PLATE 170 SPLIT; 168CM²; CABLE 3M: Brand: ERBE

## (undated) DEVICE — SOL IRR H2O BTL 1000ML STRL

## (undated) DEVICE — TUBING, SUCTION, 1/4" X 10', STRAIGHT: Brand: MEDLINE

## (undated) DEVICE — SYR LL TP 10ML STRL

## (undated) DEVICE — BOWL UTIL STRL 32OZ

## (undated) DEVICE — KT ORCA ORCAPOD DISP STRL

## (undated) DEVICE — SUCTION CANISTER, 1000CC,SAFELINER: Brand: DEROYAL

## (undated) DEVICE — FRCP BX RADJAW4 NDL 2.8 240 STD OG

## (undated) DEVICE — INTRO ACCSR BLNT TP

## (undated) DEVICE — CANNULATING SPHINCTEROTOME: Brand: JAGTOME™ REVOLUTION RX

## (undated) DEVICE — LUBE GEL ENDOGLIDE 1.1OZ

## (undated) DEVICE — CONTN GRAD MEAS TRIANG 32OZ BLK

## (undated) DEVICE — RETRIEVAL BALLOON CATHETER: Brand: EXTRACTOR™ PRO RX

## (undated) DEVICE — SYR LUERLOK 50ML

## (undated) DEVICE — DEV LK WIREGUIDE FUSN OLYMP SCP

## (undated) DEVICE — SPNG VERSALON 4X4 4PLY NONSTRL LF BG/200

## (undated) DEVICE — THE BITE BLOCK MAXI, LATEX FREE STRAP IS USED TO PROTECT THE ENDOSCOPE INSERTION TUBE FROM BEING BITTEN BY THE PATIENT.